# Patient Record
Sex: FEMALE | Race: WHITE | Employment: FULL TIME | URBAN - METROPOLITAN AREA
[De-identification: names, ages, dates, MRNs, and addresses within clinical notes are randomized per-mention and may not be internally consistent; named-entity substitution may affect disease eponyms.]

---

## 2022-01-01 ENCOUNTER — TELEPHONE (OUTPATIENT)
Dept: ONCOLOGY | Age: 42
End: 2022-01-01

## 2022-01-01 ENCOUNTER — HOSPITAL ENCOUNTER (OUTPATIENT)
Dept: INFUSION THERAPY | Age: 42
Discharge: HOME | End: 2022-07-12
Payer: COMMERCIAL

## 2022-01-01 ENCOUNTER — NURSE NAVIGATOR (OUTPATIENT)
Dept: CASE MANAGEMENT | Age: 42
End: 2022-01-01

## 2022-01-01 ENCOUNTER — HOSPITAL ENCOUNTER (INPATIENT)
Age: 42
LOS: 1 days | DRG: 871 | End: 2022-07-15
Attending: STUDENT IN AN ORGANIZED HEALTH CARE EDUCATION/TRAINING PROGRAM | Admitting: HOSPITALIST
Payer: COMMERCIAL

## 2022-01-01 ENCOUNTER — APPOINTMENT (OUTPATIENT)
Dept: GENERAL RADIOLOGY | Age: 42
End: 2022-01-01
Attending: SURGERY
Payer: COMMERCIAL

## 2022-01-01 ENCOUNTER — DOCUMENTATION ONLY (OUTPATIENT)
Dept: ONCOLOGY | Age: 42
End: 2022-01-01

## 2022-01-01 ENCOUNTER — DOCUMENTATION ONLY (OUTPATIENT)
Dept: SURGERY | Age: 42
End: 2022-01-01

## 2022-01-01 ENCOUNTER — HOSPITAL ENCOUNTER (OUTPATIENT)
Dept: INFUSION THERAPY | Age: 42
Discharge: HOME | End: 2022-07-08
Payer: COMMERCIAL

## 2022-01-01 ENCOUNTER — HOSPITAL ENCOUNTER (OUTPATIENT)
Dept: NUCLEAR MEDICINE | Age: 42
Discharge: HOME | End: 2022-06-20
Attending: SURGERY
Payer: COMMERCIAL

## 2022-01-01 ENCOUNTER — HOSPITAL ENCOUNTER (OUTPATIENT)
Age: 42
Setting detail: OUTPATIENT SURGERY
Discharge: HOME | End: 2022-06-23
Attending: SURGERY | Admitting: SURGERY
Payer: COMMERCIAL

## 2022-01-01 ENCOUNTER — HOSPITAL ENCOUNTER (OUTPATIENT)
Dept: CT IMAGING | Age: 42
Discharge: HOME | End: 2022-06-20
Attending: SURGERY
Payer: COMMERCIAL

## 2022-01-01 ENCOUNTER — APPOINTMENT (OUTPATIENT)
Dept: CT IMAGING | Age: 42
DRG: 871 | End: 2022-01-01
Payer: COMMERCIAL

## 2022-01-01 ENCOUNTER — TELEPHONE (OUTPATIENT)
Dept: PALLATIVE CARE | Age: 42
End: 2022-01-01

## 2022-01-01 ENCOUNTER — ANESTHESIA EVENT (OUTPATIENT)
Dept: INTERNAL MEDICINE UNIT | Age: 42
DRG: 871 | End: 2022-01-01
Payer: COMMERCIAL

## 2022-01-01 ENCOUNTER — ANESTHESIA EVENT (OUTPATIENT)
Dept: SURGERY | Age: 42
End: 2022-01-01
Payer: COMMERCIAL

## 2022-01-01 ENCOUNTER — ANESTHESIA (OUTPATIENT)
Dept: INTERNAL MEDICINE UNIT | Age: 42
DRG: 871 | End: 2022-01-01
Payer: COMMERCIAL

## 2022-01-01 ENCOUNTER — ANESTHESIA (OUTPATIENT)
Dept: SURGERY | Age: 42
End: 2022-01-01
Payer: COMMERCIAL

## 2022-01-01 ENCOUNTER — OFFICE VISIT (OUTPATIENT)
Dept: ONCOLOGY | Age: 42
End: 2022-01-01

## 2022-01-01 ENCOUNTER — OFFICE VISIT (OUTPATIENT)
Dept: ONCOLOGY | Age: 42
End: 2022-01-01
Payer: COMMERCIAL

## 2022-01-01 ENCOUNTER — OFFICE VISIT (OUTPATIENT)
Dept: SURGERY | Age: 42
End: 2022-01-01
Payer: COMMERCIAL

## 2022-01-01 ENCOUNTER — APPOINTMENT (OUTPATIENT)
Dept: PHYSICAL THERAPY | Age: 42
End: 2022-01-01

## 2022-01-01 ENCOUNTER — APPOINTMENT (OUTPATIENT)
Dept: GENERAL RADIOLOGY | Age: 42
DRG: 871 | End: 2022-01-01
Attending: NURSE PRACTITIONER
Payer: COMMERCIAL

## 2022-01-01 VITALS
WEIGHT: 293 LBS | SYSTOLIC BLOOD PRESSURE: 158 MMHG | DIASTOLIC BLOOD PRESSURE: 88 MMHG | HEIGHT: 62 IN | TEMPERATURE: 97.6 F | HEART RATE: 102 BPM | BODY MASS INDEX: 53.92 KG/M2 | OXYGEN SATURATION: 97 % | RESPIRATION RATE: 18 BRPM

## 2022-01-01 VITALS
BODY MASS INDEX: 53.92 KG/M2 | HEIGHT: 62 IN | SYSTOLIC BLOOD PRESSURE: 180 MMHG | DIASTOLIC BLOOD PRESSURE: 92 MMHG | OXYGEN SATURATION: 96 % | RESPIRATION RATE: 19 BRPM | TEMPERATURE: 98.7 F | WEIGHT: 293 LBS | HEART RATE: 96 BPM

## 2022-01-01 VITALS
HEIGHT: 62 IN | BODY MASS INDEX: 53.92 KG/M2 | RESPIRATION RATE: 18 BRPM | WEIGHT: 293 LBS | TEMPERATURE: 97.6 F | DIASTOLIC BLOOD PRESSURE: 81 MMHG | SYSTOLIC BLOOD PRESSURE: 159 MMHG | HEART RATE: 103 BPM | OXYGEN SATURATION: 96 %

## 2022-01-01 VITALS
BODY MASS INDEX: 55.32 KG/M2 | DIASTOLIC BLOOD PRESSURE: 89 MMHG | SYSTOLIC BLOOD PRESSURE: 134 MMHG | TEMPERATURE: 98.3 F | OXYGEN SATURATION: 97 % | WEIGHT: 293 LBS | HEART RATE: 140 BPM | HEIGHT: 61 IN

## 2022-01-01 VITALS
DIASTOLIC BLOOD PRESSURE: 86 MMHG | RESPIRATION RATE: 18 BRPM | OXYGEN SATURATION: 98 % | SYSTOLIC BLOOD PRESSURE: 165 MMHG | TEMPERATURE: 98.9 F | HEART RATE: 93 BPM

## 2022-01-01 VITALS
DIASTOLIC BLOOD PRESSURE: 88 MMHG | SYSTOLIC BLOOD PRESSURE: 163 MMHG | BODY MASS INDEX: 53.92 KG/M2 | TEMPERATURE: 98 F | OXYGEN SATURATION: 95 % | HEIGHT: 62 IN | HEART RATE: 94 BPM | WEIGHT: 293 LBS

## 2022-01-01 VITALS
WEIGHT: 293 LBS | BODY MASS INDEX: 53.92 KG/M2 | HEIGHT: 62 IN | HEART RATE: 107 BPM | SYSTOLIC BLOOD PRESSURE: 190 MMHG | DIASTOLIC BLOOD PRESSURE: 124 MMHG

## 2022-01-01 DIAGNOSIS — Z51.11 ENCOUNTER FOR ANTINEOPLASTIC CHEMOTHERAPY: ICD-10-CM

## 2022-01-01 DIAGNOSIS — A41.9 SEPSIS WITHOUT ACUTE ORGAN DYSFUNCTION, DUE TO UNSPECIFIED ORGANISM (HCC): ICD-10-CM

## 2022-01-01 DIAGNOSIS — K76.89 LIVER DYSFUNCTION: ICD-10-CM

## 2022-01-01 DIAGNOSIS — C79.51 CARCINOMA OF RIGHT BREAST METASTATIC TO BONE (HCC): ICD-10-CM

## 2022-01-01 DIAGNOSIS — C50.911 BREAST CANCER, STAGE 3, RIGHT (HCC): ICD-10-CM

## 2022-01-01 DIAGNOSIS — C50.911 CARCINOMA OF RIGHT BREAST METASTATIC TO BONE (HCC): ICD-10-CM

## 2022-01-01 DIAGNOSIS — K76.7 HEPATORENAL SYNDROME (HCC): ICD-10-CM

## 2022-01-01 DIAGNOSIS — Z51.81 ENCOUNTER FOR MONITORING CARDIOTOXIC DRUG THERAPY: Primary | ICD-10-CM

## 2022-01-01 DIAGNOSIS — C50.911 CARCINOMA OF RIGHT BREAST METASTATIC TO LIVER (HCC): Primary | ICD-10-CM

## 2022-01-01 DIAGNOSIS — C50.911 BREAST CANCER, STAGE 3, RIGHT (HCC): Primary | ICD-10-CM

## 2022-01-01 DIAGNOSIS — I95.9 HYPOTENSION, UNSPECIFIED HYPOTENSION TYPE: Primary | ICD-10-CM

## 2022-01-01 DIAGNOSIS — C50.919 METASTATIC BREAST CANCER (HCC): Primary | ICD-10-CM

## 2022-01-01 DIAGNOSIS — C50.911 CARCINOMA OF RIGHT BREAST METASTATIC TO LIVER (HCC): ICD-10-CM

## 2022-01-01 DIAGNOSIS — R19.7 DIARRHEA, UNSPECIFIED TYPE: ICD-10-CM

## 2022-01-01 DIAGNOSIS — E87.20 LACTIC ACIDOSIS: ICD-10-CM

## 2022-01-01 DIAGNOSIS — G89.3 CANCER RELATED PAIN: ICD-10-CM

## 2022-01-01 DIAGNOSIS — C78.7 CARCINOMA OF RIGHT BREAST METASTATIC TO LIVER (HCC): Primary | ICD-10-CM

## 2022-01-01 DIAGNOSIS — C50.911 BREAST CANCER, STAGE 4, RIGHT (HCC): ICD-10-CM

## 2022-01-01 DIAGNOSIS — C50.911 MALIGNANT NEOPLASM OF RIGHT FEMALE BREAST, UNSPECIFIED ESTROGEN RECEPTOR STATUS, UNSPECIFIED SITE OF BREAST (HCC): Primary | ICD-10-CM

## 2022-01-01 DIAGNOSIS — C78.7 CARCINOMA OF RIGHT BREAST METASTATIC TO LIVER (HCC): ICD-10-CM

## 2022-01-01 DIAGNOSIS — Z79.899 ENCOUNTER FOR MONITORING CARDIOTOXIC DRUG THERAPY: Primary | ICD-10-CM

## 2022-01-01 DIAGNOSIS — C78.7 METASTATIC CANCER TO LIVER (HCC): ICD-10-CM

## 2022-01-01 DIAGNOSIS — K12.30 ORAL MUCOSITIS: Primary | ICD-10-CM

## 2022-01-01 DIAGNOSIS — E87.20 METABOLIC ACIDOSIS: ICD-10-CM

## 2022-01-01 DIAGNOSIS — K72.00 ACUTE LIVER FAILURE WITHOUT HEPATIC COMA: ICD-10-CM

## 2022-01-01 LAB
ACC. NO. FROM MICRO ORDER, ACCP: ABNORMAL
ACINETOBACTER CALCOACETICUS-BAUMANII COMPLEX, ACBCX: NOT DETECTED
ALBUMIN SERPL-MCNC: 2 G/DL (ref 3.5–5)
ALBUMIN SERPL-MCNC: 2 G/DL (ref 3.5–5)
ALBUMIN SERPL-MCNC: 2.6 G/DL (ref 3.5–5)
ALBUMIN/GLOB SERPL: 0.4 {RATIO} (ref 1.1–2.2)
ALBUMIN/GLOB SERPL: 0.5 {RATIO} (ref 1.1–2.2)
ALBUMIN/GLOB SERPL: 0.6 {RATIO} (ref 1.1–2.2)
ALP SERPL-CCNC: 455 U/L (ref 45–117)
ALP SERPL-CCNC: 584 U/L (ref 45–117)
ALP SERPL-CCNC: 850 U/L (ref 45–117)
ALT SERPL-CCNC: 40 U/L (ref 12–78)
ALT SERPL-CCNC: 65 U/L (ref 12–78)
ALT SERPL-CCNC: 68 U/L (ref 12–78)
AMMONIA PLAS-SCNC: <10 UMOL/L
ANION GAP SERPL CALC-SCNC: 14 MMOL/L (ref 5–15)
ANION GAP SERPL CALC-SCNC: 15 MMOL/L (ref 5–15)
ANION GAP SERPL CALC-SCNC: 9 MMOL/L (ref 5–15)
APAP SERPL-MCNC: <2 UG/ML (ref 10–30)
ARTERIAL PATENCY WRIST A: YES
ARTERIAL PATENCY WRIST A: YES
AST SERPL-CCNC: 169 U/L (ref 15–37)
AST SERPL-CCNC: 178 U/L (ref 15–37)
AST SERPL-CCNC: 227 U/L (ref 15–37)
ATRIAL RATE: 128 BPM
BACTEROIDES FRAGILIS, BFRA: NOT DETECTED
BASE DEFICIT BLDA-SCNC: 11.7 MMOL/L
BASE DEFICIT BLDA-SCNC: 15.9 MMOL/L
BASOPHILS # BLD: 0 K/UL (ref 0–0.1)
BASOPHILS # BLD: 0 K/UL (ref 0–0.1)
BASOPHILS # BLD: 0.1 K/UL (ref 0–0.1)
BASOPHILS NFR BLD: 0 % (ref 0–1)
BASOPHILS NFR BLD: 0 % (ref 0–1)
BASOPHILS NFR BLD: 1 % (ref 0–1)
BDY SITE: ABNORMAL
BDY SITE: ABNORMAL
BILIRUB DIRECT SERPL-MCNC: 9.4 MG/DL (ref 0–0.2)
BILIRUB SERPL-MCNC: 10.7 MG/DL (ref 0.2–1)
BILIRUB SERPL-MCNC: 11.1 MG/DL (ref 0.2–1)
BILIRUB SERPL-MCNC: 7.6 MG/DL (ref 0.2–1)
BIOFIRE COMMENT, BCIDPF: ABNORMAL
BNP SERPL-MCNC: 7267 PG/ML
BUN SERPL-MCNC: 21 MG/DL (ref 6–20)
BUN SERPL-MCNC: 39 MG/DL (ref 6–20)
BUN SERPL-MCNC: 44 MG/DL (ref 6–20)
BUN/CREAT SERPL: 16 (ref 12–20)
BUN/CREAT SERPL: 17 (ref 12–20)
BUN/CREAT SERPL: 28 (ref 12–20)
C GLABRATA DNA VAG QL NAA+PROBE: NOT DETECTED
CALCIUM SERPL-MCNC: 10.6 MG/DL (ref 8.5–10.1)
CALCIUM SERPL-MCNC: 7.5 MG/DL (ref 8.5–10.1)
CALCIUM SERPL-MCNC: 8.1 MG/DL (ref 8.5–10.1)
CALCULATED P AXIS, ECG09: 80 DEGREES
CALCULATED R AXIS, ECG10: 2 DEGREES
CALCULATED T AXIS, ECG11: -17 DEGREES
CANDIDA ALBICANS: NOT DETECTED
CANDIDA AURIS, CAAU: NOT DETECTED
CANDIDA KRUSEI, CKRP: NOT DETECTED
CANDIDA PARAPSILOSIS, CPAUP: NOT DETECTED
CANDIDA TROPICALIS, CTROP: NOT DETECTED
CHLORIDE SERPL-SCNC: 100 MMOL/L (ref 97–108)
CHLORIDE SERPL-SCNC: 101 MMOL/L (ref 97–108)
CHLORIDE SERPL-SCNC: 105 MMOL/L (ref 97–108)
CO2 SERPL-SCNC: 14 MMOL/L (ref 21–32)
CO2 SERPL-SCNC: 16 MMOL/L (ref 21–32)
CO2 SERPL-SCNC: 23 MMOL/L (ref 21–32)
COVID-19 RAPID TEST, COVR: NOT DETECTED
CREAT SERPL-MCNC: 0.74 MG/DL (ref 0.55–1.02)
CREAT SERPL-MCNC: 2.23 MG/DL (ref 0.55–1.02)
CREAT SERPL-MCNC: 2.77 MG/DL (ref 0.55–1.02)
CRYPTO NEOFORMANS/GATTII, CRYNEG: NOT DETECTED
CTX-M (ESBL RESISTANT GENE), CTX: NOT DETECTED
DIAGNOSIS, 93000: NORMAL
DIFFERENTIAL METHOD BLD: ABNORMAL
ENTEROBACTER CLOACAE COMPLEX, ECCP: NOT DETECTED
ENTEROBACTERALES SP. , ENBLS: DETECTED
ENTEROCOCCUS FAECALIS, ENFA: NOT DETECTED
ENTEROCOCCUS FAECIUM, ENFAM: NOT DETECTED
EOSINOPHIL # BLD: 0 K/UL (ref 0–0.4)
EOSINOPHIL # BLD: 0 K/UL (ref 0–0.4)
EOSINOPHIL # BLD: 0.1 K/UL (ref 0–0.4)
EOSINOPHIL NFR BLD: 0 % (ref 0–7)
EOSINOPHIL NFR BLD: 0 % (ref 0–7)
EOSINOPHIL NFR BLD: 1 % (ref 0–7)
ERYTHROCYTE [DISTWIDTH] IN BLOOD BY AUTOMATED COUNT: 20.8 % (ref 11.5–14.5)
ERYTHROCYTE [DISTWIDTH] IN BLOOD BY AUTOMATED COUNT: 20.8 % (ref 11.5–14.5)
ERYTHROCYTE [DISTWIDTH] IN BLOOD BY AUTOMATED COUNT: 21.2 % (ref 11.5–14.5)
ESCHERICHIA COLI: DETECTED
GAS FLOW.O2 O2 DELIVERY SYS: 2 L/MIN
GLOBULIN SER CALC-MCNC: 4.3 G/DL (ref 2–4)
GLOBULIN SER CALC-MCNC: 4.4 G/DL (ref 2–4)
GLOBULIN SER CALC-MCNC: 5 G/DL (ref 2–4)
GLUCOSE BLD STRIP.AUTO-MCNC: 63 MG/DL (ref 65–117)
GLUCOSE BLD STRIP.AUTO-MCNC: 79 MG/DL (ref 65–117)
GLUCOSE SERPL-MCNC: 68 MG/DL (ref 65–100)
GLUCOSE SERPL-MCNC: 95 MG/DL (ref 65–100)
GLUCOSE SERPL-MCNC: 98 MG/DL (ref 65–100)
HAEMOPHILUS INFLUENZAE, HMI: NOT DETECTED
HAV IGM SER QL: NONREACTIVE
HAV IGM SER QL: NONREACTIVE
HBV CORE IGM SER QL: NONREACTIVE
HBV CORE IGM SER QL: NONREACTIVE
HBV SURFACE AG SER QL: 0.16 INDEX
HBV SURFACE AG SER QL: <0.1 INDEX
HBV SURFACE AG SER QL: NEGATIVE
HBV SURFACE AG SER QL: NEGATIVE
HCO3 BLDA-SCNC: 10 MMOL/L (ref 22–26)
HCO3 BLDA-SCNC: 12 MMOL/L (ref 22–26)
HCT VFR BLD AUTO: 35.3 % (ref 35–47)
HCT VFR BLD AUTO: 35.9 % (ref 35–47)
HCT VFR BLD AUTO: 41.2 % (ref 35–47)
HCV AB SERPL QL IA: NONREACTIVE
HCV AB SERPL QL IA: NONREACTIVE
HGB BLD-MCNC: 11.1 G/DL (ref 11.5–16)
HGB BLD-MCNC: 11.6 G/DL (ref 11.5–16)
HGB BLD-MCNC: 12.9 G/DL (ref 11.5–16)
IMM GRANULOCYTES # BLD AUTO: 0 K/UL (ref 0–0.04)
IMM GRANULOCYTES # BLD AUTO: 0 K/UL (ref 0–0.04)
IMM GRANULOCYTES # BLD AUTO: 0.1 K/UL (ref 0–0.04)
IMM GRANULOCYTES NFR BLD AUTO: 0 % (ref 0–0.5)
IMM GRANULOCYTES NFR BLD AUTO: 0 % (ref 0–0.5)
IMM GRANULOCYTES NFR BLD AUTO: 1 % (ref 0–0.5)
IMP (CARBAPENEMASE RESISTANT GENE), IMPC: NOT DETECTED
INR PPP: 2.1 (ref 0.9–1.1)
KLEBSIELLA AEROGENES, KLAE: NOT DETECTED
KLEBSIELLA OXYTOCA: NOT DETECTED
KLEBSIELLA PNEUMONIAE GROUP, KPPG: NOT DETECTED
KPC (CARBAPENEM RESISTANCE GENE): NOT DETECTED
LACTATE SERPL-SCNC: 4.9 MMOL/L (ref 0.4–2)
LACTATE SERPL-SCNC: 5.1 MMOL/L (ref 0.4–2)
LACTATE SERPL-SCNC: 5.9 MMOL/L (ref 0.4–2)
LIPASE SERPL-CCNC: 27 U/L (ref 73–393)
LISTERIA MONOCYTOGENES, LMONP: NOT DETECTED
LYMPHOCYTES # BLD: 0.3 K/UL (ref 0.8–3.5)
LYMPHOCYTES # BLD: 0.4 K/UL (ref 0.8–3.5)
LYMPHOCYTES # BLD: 1.8 K/UL (ref 0.8–3.5)
LYMPHOCYTES NFR BLD: 14 % (ref 12–49)
LYMPHOCYTES NFR BLD: 72 % (ref 12–49)
LYMPHOCYTES NFR BLD: 76 % (ref 12–49)
MAGNESIUM SERPL-MCNC: 2.7 MG/DL (ref 1.6–2.4)
MCH RBC QN AUTO: 27.4 PG (ref 26–34)
MCH RBC QN AUTO: 27.9 PG (ref 26–34)
MCH RBC QN AUTO: 28 PG (ref 26–34)
MCHC RBC AUTO-ENTMCNC: 31.3 G/DL (ref 30–36.5)
MCHC RBC AUTO-ENTMCNC: 31.4 G/DL (ref 30–36.5)
MCHC RBC AUTO-ENTMCNC: 32.3 G/DL (ref 30–36.5)
MCR-1 (COLISTIN RESISTANT GENE), MCR: NOT DETECTED
MCV RBC AUTO: 86.5 FL (ref 80–99)
MCV RBC AUTO: 87.5 FL (ref 80–99)
MCV RBC AUTO: 88.7 FL (ref 80–99)
MONOCYTES # BLD: 0 K/UL (ref 0–1)
MONOCYTES # BLD: 0 K/UL (ref 0–1)
MONOCYTES # BLD: 1 K/UL (ref 0–1)
MONOCYTES NFR BLD: 12 % (ref 5–13)
MONOCYTES NFR BLD: 16 % (ref 5–13)
MONOCYTES NFR BLD: 8 % (ref 5–13)
NDM (CARBAPENEMASE RESISTANT GENE), NDM: NOT DETECTED
NEISSERIA MENINGITIDIS, NMNI: NOT DETECTED
NEUTS SEG # BLD: 0 K/UL (ref 1.8–8)
NEUTS SEG # BLD: 0 K/UL (ref 1.8–8)
NEUTS SEG # BLD: 9.5 K/UL (ref 1.8–8)
NEUTS SEG NFR BLD: 12 % (ref 32–75)
NEUTS SEG NFR BLD: 12 % (ref 32–75)
NEUTS SEG NFR BLD: 75 % (ref 32–75)
NRBC # BLD: 0 K/UL (ref 0–0.01)
NRBC # BLD: 0.02 K/UL (ref 0–0.01)
NRBC # BLD: 0.02 K/UL (ref 0–0.01)
NRBC BLD-RTO: 0 PER 100 WBC
NRBC BLD-RTO: 5.1 PER 100 WBC
NRBC BLD-RTO: 6.9 PER 100 WBC
OXA-48-LIKE (CARBAPENEMASE RESISTANT GENE), OXA48: NOT DETECTED
P-R INTERVAL, ECG05: 160 MS
PCO2 BLDA: 24 MMHG (ref 35–45)
PCO2 BLDA: 25 MMHG (ref 35–45)
PH BLDA: 7.22 [PH] (ref 7.35–7.45)
PH BLDA: 7.32 [PH] (ref 7.35–7.45)
PLATELET # BLD AUTO: 251 K/UL (ref 150–400)
PLATELET # BLD AUTO: 273 K/UL (ref 150–400)
PLATELET # BLD AUTO: 340 K/UL (ref 150–400)
PLATELET COMMENTS,PCOM: ABNORMAL
PMV BLD AUTO: 11.5 FL (ref 8.9–12.9)
PO2 BLDA: 100 MMHG (ref 80–100)
PO2 BLDA: 94 MMHG (ref 80–100)
POTASSIUM SERPL-SCNC: 3.5 MMOL/L (ref 3.5–5.1)
POTASSIUM SERPL-SCNC: 4.5 MMOL/L (ref 3.5–5.1)
POTASSIUM SERPL-SCNC: 4.6 MMOL/L (ref 3.5–5.1)
PROT SERPL-MCNC: 6.4 G/DL (ref 6.4–8.2)
PROT SERPL-MCNC: 6.9 G/DL (ref 6.4–8.2)
PROT SERPL-MCNC: 7 G/DL (ref 6.4–8.2)
PROTEUS, PRP: NOT DETECTED
PROTHROMBIN TIME: 20.6 SEC (ref 9–11.1)
PSEUDOMONAS AERUGINOSA: NOT DETECTED
Q-T INTERVAL, ECG07: 308 MS
QRS DURATION, ECG06: 74 MS
QTC CALCULATION (BEZET), ECG08: 449 MS
RBC # BLD AUTO: 3.98 M/UL (ref 3.8–5.2)
RBC # BLD AUTO: 4.15 M/UL (ref 3.8–5.2)
RBC # BLD AUTO: 4.71 M/UL (ref 3.8–5.2)
RBC MORPH BLD: ABNORMAL
RESISTANT GENE SPACE, REGENE: ABNORMAL
SALICYLATES SERPL-MCNC: <1.7 MG/DL (ref 2.8–20)
SALMONELLA, SALMO: NOT DETECTED
SAO2 % BLD: 96 % (ref 92–97)
SAO2 % BLD: 97 % (ref 92–97)
SAO2% DEVICE SAO2% SENSOR NAME: ABNORMAL
SAO2% DEVICE SAO2% SENSOR NAME: ABNORMAL
SERRATIA MARCESCENS: NOT DETECTED
SERVICE CMNT-IMP: ABNORMAL
SERVICE CMNT-IMP: ABNORMAL
SERVICE CMNT-IMP: NORMAL
SODIUM SERPL-SCNC: 130 MMOL/L (ref 136–145)
SODIUM SERPL-SCNC: 130 MMOL/L (ref 136–145)
SODIUM SERPL-SCNC: 137 MMOL/L (ref 136–145)
SOURCE, COVRS: NORMAL
SP1: NORMAL
SP1: NORMAL
SP2: NORMAL
SP2: NORMAL
SP3: NORMAL
SP3: NORMAL
SPECIMEN SITE: ABNORMAL
SPECIMEN SITE: ABNORMAL
STAPH EPIDERMIDIS, STEP: NOT DETECTED
STAPH LUGDUNENSIS, STALUG: NOT DETECTED
STAPHYLOCOCCUS AUREUS: NOT DETECTED
STAPHYLOCOCCUS, STAPP: NOT DETECTED
STENO MALTOPHILIA, STMA: NOT DETECTED
STREPTOCOCCUS , STPSP: DETECTED
STREPTOCOCCUS AGALACTIAE (GROUP B): NOT DETECTED
STREPTOCOCCUS PNEUMONIAE , SPNP: NOT DETECTED
STREPTOCOCCUS PYOGENES (GROUP A), SPYOP: NOT DETECTED
TOTAL CELLS COUNTED SPEC: 25
TOTAL CELLS COUNTED SPEC: 25
TROPONIN-HIGH SENSITIVITY: <4 NG/L (ref 0–51)
VENTRICULAR RATE, ECG03: 128 BPM
VIM (CARBAPENEMASE RESISTANT GENE), VIM: NOT DETECTED
WBC # BLD AUTO: 0.3 K/UL (ref 3.6–11)
WBC # BLD AUTO: 0.4 K/UL (ref 3.6–11)
WBC # BLD AUTO: 12.6 K/UL (ref 3.6–11)
WBC MORPH BLD: ABNORMAL

## 2022-01-01 PROCEDURE — 82803 BLOOD GASES ANY COMBINATION: CPT

## 2022-01-01 PROCEDURE — 74011250636 HC RX REV CODE- 250/636: Performed by: HOSPITALIST

## 2022-01-01 PROCEDURE — 96375 TX/PRO/DX INJ NEW DRUG ADDON: CPT

## 2022-01-01 PROCEDURE — 74011000250 HC RX REV CODE- 250: Performed by: HOSPITALIST

## 2022-01-01 PROCEDURE — 74011000258 HC RX REV CODE- 258

## 2022-01-01 PROCEDURE — 71045 X-RAY EXAM CHEST 1 VIEW: CPT

## 2022-01-01 PROCEDURE — 76937 US GUIDE VASCULAR ACCESS: CPT | Performed by: SURGERY

## 2022-01-01 PROCEDURE — 74011250636 HC RX REV CODE- 250/636: Performed by: INTERNAL MEDICINE

## 2022-01-01 PROCEDURE — 87150 DNA/RNA AMPLIFIED PROBE: CPT

## 2022-01-01 PROCEDURE — 96367 TX/PROPH/DG ADDL SEQ IV INF: CPT

## 2022-01-01 PROCEDURE — 36415 COLL VENOUS BLD VENIPUNCTURE: CPT

## 2022-01-01 PROCEDURE — 96417 CHEMO IV INFUS EACH ADDL SEQ: CPT

## 2022-01-01 PROCEDURE — 80076 HEPATIC FUNCTION PANEL: CPT

## 2022-01-01 PROCEDURE — 71260 CT THORAX DX C+: CPT

## 2022-01-01 PROCEDURE — 36600 WITHDRAWAL OF ARTERIAL BLOOD: CPT

## 2022-01-01 PROCEDURE — 0BH17EZ INSERTION OF ENDOTRACHEAL AIRWAY INTO TRACHEA, VIA NATURAL OR ARTIFICIAL OPENING: ICD-10-PCS | Performed by: ANESTHESIOLOGY

## 2022-01-01 PROCEDURE — 96366 THER/PROPH/DIAG IV INF ADDON: CPT

## 2022-01-01 PROCEDURE — 85610 PROTHROMBIN TIME: CPT

## 2022-01-01 PROCEDURE — 74011000250 HC RX REV CODE- 250: Performed by: SURGERY

## 2022-01-01 PROCEDURE — 85025 COMPLETE CBC W/AUTO DIFF WBC: CPT

## 2022-01-01 PROCEDURE — 94002 VENT MGMT INPAT INIT DAY: CPT

## 2022-01-01 PROCEDURE — 77030002996 HC SUT SLK J&J -A: Performed by: SURGERY

## 2022-01-01 PROCEDURE — 71250 CT THORAX DX C-: CPT

## 2022-01-01 PROCEDURE — 74011000250 HC RX REV CODE- 250: Performed by: INTERNAL MEDICINE

## 2022-01-01 PROCEDURE — 82962 GLUCOSE BLOOD TEST: CPT

## 2022-01-01 PROCEDURE — P9047 ALBUMIN (HUMAN), 25%, 50ML: HCPCS | Performed by: HOSPITALIST

## 2022-01-01 PROCEDURE — 78306 BONE IMAGING WHOLE BODY: CPT

## 2022-01-01 PROCEDURE — 76210000034 HC AMBSU PH I REC 0.5 TO 1 HR: Performed by: SURGERY

## 2022-01-01 PROCEDURE — 80143 DRUG ASSAY ACETAMINOPHEN: CPT

## 2022-01-01 PROCEDURE — 96374 THER/PROPH/DIAG INJ IV PUSH: CPT

## 2022-01-01 PROCEDURE — 76030000001 HC AMB SURG OR TIME 1 TO 1.5: Performed by: SURGERY

## 2022-01-01 PROCEDURE — 80053 COMPREHEN METABOLIC PANEL: CPT

## 2022-01-01 PROCEDURE — 77030010509 HC AIRWY LMA MSK TELE -A: Performed by: ANESTHESIOLOGY

## 2022-01-01 PROCEDURE — 99205 OFFICE O/P NEW HI 60 MIN: CPT | Performed by: INTERNAL MEDICINE

## 2022-01-01 PROCEDURE — 80074 ACUTE HEPATITIS PANEL: CPT

## 2022-01-01 PROCEDURE — 74011000250 HC RX REV CODE- 250: Performed by: REGISTERED NURSE

## 2022-01-01 PROCEDURE — 80048 BASIC METABOLIC PNL TOTAL CA: CPT

## 2022-01-01 PROCEDURE — 76060000062 HC AMB SURG ANES 1 TO 1.5 HR: Performed by: SURGERY

## 2022-01-01 PROCEDURE — 74177 CT ABD & PELVIS W/CONTRAST: CPT

## 2022-01-01 PROCEDURE — 36561 INSERT TUNNELED CV CATH: CPT | Performed by: SURGERY

## 2022-01-01 PROCEDURE — 74011250637 HC RX REV CODE- 250/637: Performed by: HOSPITALIST

## 2022-01-01 PROCEDURE — 87186 SC STD MICRODIL/AGAR DIL: CPT

## 2022-01-01 PROCEDURE — 77030031139 HC SUT VCRL2 J&J -A: Performed by: SURGERY

## 2022-01-01 PROCEDURE — 87077 CULTURE AEROBIC IDENTIFY: CPT

## 2022-01-01 PROCEDURE — 74011250636 HC RX REV CODE- 250/636

## 2022-01-01 PROCEDURE — 77030012965 HC NDL HUBR BBMI -A

## 2022-01-01 PROCEDURE — 74011250636 HC RX REV CODE- 250/636: Performed by: SURGERY

## 2022-01-01 PROCEDURE — 96365 THER/PROPH/DIAG IV INF INIT: CPT

## 2022-01-01 PROCEDURE — 74018 RADEX ABDOMEN 1 VIEW: CPT

## 2022-01-01 PROCEDURE — 5A1935Z RESPIRATORY VENTILATION, LESS THAN 24 CONSECUTIVE HOURS: ICD-10-PCS | Performed by: ANESTHESIOLOGY

## 2022-01-01 PROCEDURE — 74011000250 HC RX REV CODE- 250: Performed by: NURSE PRACTITIONER

## 2022-01-01 PROCEDURE — 83605 ASSAY OF LACTIC ACID: CPT

## 2022-01-01 PROCEDURE — 74011250636 HC RX REV CODE- 250/636: Performed by: ANESTHESIOLOGY

## 2022-01-01 PROCEDURE — 2709999900 HC NON-CHARGEABLE SUPPLY: Performed by: SURGERY

## 2022-01-01 PROCEDURE — 87635 SARS-COV-2 COVID-19 AMP PRB: CPT

## 2022-01-01 PROCEDURE — C1788 PORT, INDWELLING, IMP: HCPCS | Performed by: SURGERY

## 2022-01-01 PROCEDURE — 74011000272 HC RX REV CODE- 272: Performed by: SURGERY

## 2022-01-01 PROCEDURE — 96361 HYDRATE IV INFUSION ADD-ON: CPT

## 2022-01-01 PROCEDURE — 77001 FLUOROGUIDE FOR VEIN DEVICE: CPT | Performed by: SURGERY

## 2022-01-01 PROCEDURE — 83690 ASSAY OF LIPASE: CPT

## 2022-01-01 PROCEDURE — 93005 ELECTROCARDIOGRAM TRACING: CPT

## 2022-01-01 PROCEDURE — 96413 CHEMO IV INFUSION 1 HR: CPT

## 2022-01-01 PROCEDURE — 87040 BLOOD CULTURE FOR BACTERIA: CPT

## 2022-01-01 PROCEDURE — 80179 DRUG ASSAY SALICYLATE: CPT

## 2022-01-01 PROCEDURE — 74011000636 HC RX REV CODE- 636: Performed by: STUDENT IN AN ORGANIZED HEALTH CARE EDUCATION/TRAINING PROGRAM

## 2022-01-01 PROCEDURE — 74011250636 HC RX REV CODE- 250/636: Performed by: REGISTERED NURSE

## 2022-01-01 PROCEDURE — 74176 CT ABD & PELVIS W/O CONTRAST: CPT

## 2022-01-01 PROCEDURE — 77030011267 HC ELECTRD BLD COVD -A: Performed by: SURGERY

## 2022-01-01 PROCEDURE — 76210000046 HC AMBSU PH II REC FIRST 0.5 HR: Performed by: SURGERY

## 2022-01-01 PROCEDURE — 76642 ULTRASOUND BREAST LIMITED: CPT | Performed by: SURGERY

## 2022-01-01 PROCEDURE — 99215 OFFICE O/P EST HI 40 MIN: CPT | Performed by: INTERNAL MEDICINE

## 2022-01-01 PROCEDURE — 84484 ASSAY OF TROPONIN QUANT: CPT

## 2022-01-01 PROCEDURE — 74011000258 HC RX REV CODE- 258: Performed by: INTERNAL MEDICINE

## 2022-01-01 PROCEDURE — 77030002986 HC SUT PROL J&J -A: Performed by: SURGERY

## 2022-01-01 PROCEDURE — P9045 ALBUMIN (HUMAN), 5%, 250 ML: HCPCS

## 2022-01-01 PROCEDURE — 65270000046 HC RM TELEMETRY

## 2022-01-01 PROCEDURE — 74011000636 HC RX REV CODE- 636: Performed by: SURGERY

## 2022-01-01 PROCEDURE — 82140 ASSAY OF AMMONIA: CPT

## 2022-01-01 PROCEDURE — 77030010507 HC ADH SKN DERMBND J&J -B: Performed by: SURGERY

## 2022-01-01 PROCEDURE — 96360 HYDRATION IV INFUSION INIT: CPT

## 2022-01-01 PROCEDURE — 74011250636 HC RX REV CODE- 250/636: Performed by: NURSE PRACTITIONER

## 2022-01-01 PROCEDURE — 76000 FLUOROSCOPY <1 HR PHYS/QHP: CPT

## 2022-01-01 PROCEDURE — 77030002933 HC SUT MCRYL J&J -A: Performed by: SURGERY

## 2022-01-01 PROCEDURE — 99285 EMERGENCY DEPT VISIT HI MDM: CPT

## 2022-01-01 PROCEDURE — 99205 OFFICE O/P NEW HI 60 MIN: CPT | Performed by: SURGERY

## 2022-01-01 PROCEDURE — 83880 ASSAY OF NATRIURETIC PEPTIDE: CPT

## 2022-01-01 PROCEDURE — 83735 ASSAY OF MAGNESIUM: CPT

## 2022-01-01 DEVICE — POWERPORT IMPLANTABLE PORT WITH ATTACHABLE 8F CHRONOFLEX OPEN-ENDED SINGLE-LUMEN VENOUS CATHETER INTERMEDIATE KIT (WITHOUT SUTURE PLUGS)
Type: IMPLANTABLE DEVICE | Site: CHEST | Status: FUNCTIONAL
Brand: POWERPORT, CHRONOFLEX

## 2022-01-01 RX ORDER — ONDANSETRON 2 MG/ML
4 INJECTION INTRAMUSCULAR; INTRAVENOUS
Status: DISCONTINUED | OUTPATIENT
Start: 2022-01-01 | End: 2022-01-01 | Stop reason: HOSPADM

## 2022-01-01 RX ORDER — SODIUM CHLORIDE 9 MG/ML
5-250 INJECTION, SOLUTION INTRAVENOUS AS NEEDED
Status: DISPENSED | OUTPATIENT
Start: 2022-01-01 | End: 2022-01-01

## 2022-01-01 RX ORDER — SODIUM CHLORIDE 9 MG/ML
5-250 INJECTION, SOLUTION INTRAVENOUS AS NEEDED
Status: CANCELLED | OUTPATIENT
Start: 2022-07-29

## 2022-01-01 RX ORDER — ONDANSETRON 2 MG/ML
4 INJECTION INTRAMUSCULAR; INTRAVENOUS ONCE
Status: COMPLETED | OUTPATIENT
Start: 2022-01-01 | End: 2022-01-01

## 2022-01-01 RX ORDER — SODIUM BICARBONATE 1 MEQ/ML
100 SYRINGE (ML) INTRAVENOUS ONCE
Status: COMPLETED | OUTPATIENT
Start: 2022-01-01 | End: 2022-01-01

## 2022-01-01 RX ORDER — SODIUM CHLORIDE 0.9 % (FLUSH) 0.9 %
5-10 SYRINGE (ML) INJECTION AS NEEDED
Status: DISCONTINUED | OUTPATIENT
Start: 2022-01-01 | End: 2022-01-01

## 2022-01-01 RX ORDER — MIDAZOLAM HYDROCHLORIDE 1 MG/ML
INJECTION, SOLUTION INTRAMUSCULAR; INTRAVENOUS
Status: DISCONTINUED
Start: 2022-01-01 | End: 2022-01-01 | Stop reason: HOSPADM

## 2022-01-01 RX ORDER — EPINEPHRINE 1 MG/ML
0.3 INJECTION, SOLUTION, CONCENTRATE INTRAVENOUS AS NEEDED
Status: CANCELLED | OUTPATIENT
Start: 2022-07-29

## 2022-01-01 RX ORDER — HYDROCORTISONE SODIUM SUCCINATE 100 MG/2ML
100 INJECTION, POWDER, FOR SOLUTION INTRAMUSCULAR; INTRAVENOUS AS NEEDED
Status: CANCELLED | OUTPATIENT
Start: 2022-01-01

## 2022-01-01 RX ORDER — SODIUM CHLORIDE 9 MG/ML
5-250 INJECTION, SOLUTION INTRAVENOUS AS NEEDED
Status: CANCELLED | OUTPATIENT
Start: 2022-01-01

## 2022-01-01 RX ORDER — VANCOMYCIN 2 GRAM/500 ML IN 0.9 % SODIUM CHLORIDE INTRAVENOUS
2000 ONCE
Status: DISCONTINUED | OUTPATIENT
Start: 2022-01-01 | End: 2022-01-01 | Stop reason: DRUGHIGH

## 2022-01-01 RX ORDER — DEXAMETHASONE SODIUM PHOSPHATE 4 MG/ML
INJECTION, SOLUTION INTRA-ARTICULAR; INTRALESIONAL; INTRAMUSCULAR; INTRAVENOUS; SOFT TISSUE AS NEEDED
Status: DISCONTINUED | OUTPATIENT
Start: 2022-01-01 | End: 2022-01-01 | Stop reason: HOSPADM

## 2022-01-01 RX ORDER — ACETAMINOPHEN 650 MG/1
650 SUPPOSITORY RECTAL
Status: DISCONTINUED | OUTPATIENT
Start: 2022-01-01 | End: 2022-01-01 | Stop reason: HOSPADM

## 2022-01-01 RX ORDER — SODIUM CHLORIDE 0.9 % (FLUSH) 0.9 %
5-40 SYRINGE (ML) INJECTION AS NEEDED
Status: CANCELLED | OUTPATIENT
Start: 2022-07-29

## 2022-01-01 RX ORDER — ONDANSETRON 2 MG/ML
8 INJECTION INTRAMUSCULAR; INTRAVENOUS AS NEEDED
Status: CANCELLED | OUTPATIENT
Start: 2022-01-01

## 2022-01-01 RX ORDER — PROPOFOL 10 MG/ML
0-50 VIAL (ML) INTRAVENOUS
Status: DISCONTINUED | OUTPATIENT
Start: 2022-01-01 | End: 2022-01-01

## 2022-01-01 RX ORDER — CLINDAMYCIN PHOSPHATE 900 MG/50ML
INJECTION, SOLUTION INTRAVENOUS
Status: COMPLETED
Start: 2022-01-01 | End: 2022-01-01

## 2022-01-01 RX ORDER — SODIUM CHLORIDE 0.9 % (FLUSH) 0.9 %
5-40 SYRINGE (ML) INJECTION AS NEEDED
Status: CANCELLED | OUTPATIENT
Start: 2022-01-01

## 2022-01-01 RX ORDER — ACETAMINOPHEN 325 MG/1
650 TABLET ORAL AS NEEDED
Status: CANCELLED
Start: 2022-01-01

## 2022-01-01 RX ORDER — SODIUM CHLORIDE 9 MG/ML
5-40 INJECTION INTRAMUSCULAR; INTRAVENOUS; SUBCUTANEOUS AS NEEDED
Status: CANCELLED | OUTPATIENT
Start: 2022-01-01

## 2022-01-01 RX ORDER — ALBUTEROL SULFATE 0.83 MG/ML
2.5 SOLUTION RESPIRATORY (INHALATION) AS NEEDED
Status: CANCELLED
Start: 2022-07-29

## 2022-01-01 RX ORDER — BARIUM SULFATE 20 MG/ML
900 SUSPENSION ORAL
Status: COMPLETED | OUTPATIENT
Start: 2022-01-01 | End: 2022-01-01

## 2022-01-01 RX ORDER — ONDANSETRON 2 MG/ML
4 INJECTION INTRAMUSCULAR; INTRAVENOUS
Status: DISCONTINUED | OUTPATIENT
Start: 2022-01-01 | End: 2022-01-01 | Stop reason: SDUPTHER

## 2022-01-01 RX ORDER — ACETAMINOPHEN 325 MG/1
650 TABLET ORAL
Status: CANCELLED | OUTPATIENT
Start: 2022-07-29

## 2022-01-01 RX ORDER — OXYCODONE AND ACETAMINOPHEN 5; 325 MG/1; MG/1
1 TABLET ORAL
Status: DISCONTINUED | OUTPATIENT
Start: 2022-01-01 | End: 2022-01-01 | Stop reason: HOSPADM

## 2022-01-01 RX ORDER — DIPHENHYDRAMINE HYDROCHLORIDE 50 MG/ML
25 INJECTION, SOLUTION INTRAMUSCULAR; INTRAVENOUS AS NEEDED
Status: CANCELLED
Start: 2022-01-01

## 2022-01-01 RX ORDER — SODIUM CHLORIDE 9 MG/ML
5-250 INJECTION, SOLUTION INTRAVENOUS AS NEEDED
Status: CANCELLED | OUTPATIENT
Start: 2022-08-19

## 2022-01-01 RX ORDER — PROPOFOL 10 MG/ML
INJECTION, EMULSION INTRAVENOUS AS NEEDED
Status: DISCONTINUED | OUTPATIENT
Start: 2022-01-01 | End: 2022-01-01 | Stop reason: HOSPADM

## 2022-01-01 RX ORDER — OXYCODONE AND ACETAMINOPHEN 5; 325 MG/1; MG/1
1 TABLET ORAL
Qty: 30 TABLET | Refills: 0 | Status: SHIPPED | OUTPATIENT
Start: 2022-01-01 | End: 2022-01-01

## 2022-01-01 RX ORDER — ONDANSETRON 4 MG/1
4 TABLET, ORALLY DISINTEGRATING ORAL
Status: DISCONTINUED | OUTPATIENT
Start: 2022-01-01 | End: 2022-01-01

## 2022-01-01 RX ORDER — HEPARIN SODIUM 5000 [USP'U]/ML
5000 INJECTION, SOLUTION INTRAVENOUS; SUBCUTANEOUS EVERY 12 HOURS
Status: DISCONTINUED | OUTPATIENT
Start: 2022-01-01 | End: 2022-01-01 | Stop reason: HOSPADM

## 2022-01-01 RX ORDER — HYDROCODONE BITARTRATE AND ACETAMINOPHEN 5; 325 MG/1; MG/1
1 TABLET ORAL
Qty: 30 TABLET | Refills: 0 | Status: SHIPPED | OUTPATIENT
Start: 2022-01-01 | End: 2022-01-01 | Stop reason: ALTCHOICE

## 2022-01-01 RX ORDER — EPINEPHRINE 1 MG/ML
0.3 INJECTION, SOLUTION, CONCENTRATE INTRAVENOUS AS NEEDED
Status: CANCELLED | OUTPATIENT
Start: 2022-01-01

## 2022-01-01 RX ORDER — ACETAMINOPHEN 325 MG/1
650 TABLET ORAL
Status: DISCONTINUED | OUTPATIENT
Start: 2022-01-01 | End: 2022-01-01 | Stop reason: HOSPADM

## 2022-01-01 RX ORDER — DROPERIDOL 2.5 MG/ML
0.62 INJECTION, SOLUTION INTRAMUSCULAR; INTRAVENOUS AS NEEDED
Status: DISCONTINUED | OUTPATIENT
Start: 2022-01-01 | End: 2022-01-01 | Stop reason: HOSPADM

## 2022-01-01 RX ORDER — SODIUM CHLORIDE 9 MG/ML
5-40 INJECTION INTRAMUSCULAR; INTRAVENOUS; SUBCUTANEOUS AS NEEDED
Status: CANCELLED | OUTPATIENT
Start: 2022-08-19

## 2022-01-01 RX ORDER — HEPARIN 100 UNIT/ML
500 SYRINGE INTRAVENOUS AS NEEDED
Status: CANCELLED
Start: 2022-01-01

## 2022-01-01 RX ORDER — DEXAMETHASONE SODIUM PHOSPHATE 100 MG/10ML
10 INJECTION INTRAMUSCULAR; INTRAVENOUS ONCE
Status: CANCELLED | OUTPATIENT
Start: 2022-01-01 | End: 2022-01-01

## 2022-01-01 RX ORDER — DIPHENHYDRAMINE HYDROCHLORIDE 50 MG/ML
50 INJECTION, SOLUTION INTRAMUSCULAR; INTRAVENOUS
Status: CANCELLED | OUTPATIENT
Start: 2022-08-19

## 2022-01-01 RX ORDER — ALBUTEROL SULFATE 0.83 MG/ML
2.5 SOLUTION RESPIRATORY (INHALATION) AS NEEDED
Status: CANCELLED
Start: 2022-08-19

## 2022-01-01 RX ORDER — SODIUM CHLORIDE 9 MG/ML
5-40 INJECTION INTRAMUSCULAR; INTRAVENOUS; SUBCUTANEOUS AS NEEDED
Status: CANCELLED | OUTPATIENT
Start: 2022-07-29

## 2022-01-01 RX ORDER — HEPARIN 100 UNIT/ML
500 SYRINGE INTRAVENOUS AS NEEDED
Status: DISCONTINUED | OUTPATIENT
Start: 2022-01-01 | End: 2022-01-01 | Stop reason: HOSPADM

## 2022-01-01 RX ORDER — SODIUM CHLORIDE 0.9 % (FLUSH) 0.9 %
5-40 SYRINGE (ML) INJECTION AS NEEDED
Status: CANCELLED | OUTPATIENT
Start: 2022-08-19

## 2022-01-01 RX ORDER — POLYETHYLENE GLYCOL 3350 17 G/17G
17 POWDER, FOR SOLUTION ORAL DAILY PRN
Status: DISCONTINUED | OUTPATIENT
Start: 2022-01-01 | End: 2022-01-01 | Stop reason: HOSPADM

## 2022-01-01 RX ORDER — LIDOCAINE 4 G/100G
2 PATCH TOPICAL EVERY 24 HOURS
Status: DISCONTINUED | OUTPATIENT
Start: 2022-01-01 | End: 2022-01-01 | Stop reason: HOSPADM

## 2022-01-01 RX ORDER — DIPHENHYDRAMINE HYDROCHLORIDE 50 MG/ML
50 INJECTION, SOLUTION INTRAMUSCULAR; INTRAVENOUS AS NEEDED
Status: DISCONTINUED | OUTPATIENT
Start: 2022-01-01 | End: 2022-01-01 | Stop reason: HOSPADM

## 2022-01-01 RX ORDER — MORPHINE SULFATE 10 MG/ML
2 INJECTION, SOLUTION INTRAMUSCULAR; INTRAVENOUS
Status: DISCONTINUED | OUTPATIENT
Start: 2022-01-01 | End: 2022-01-01 | Stop reason: HOSPADM

## 2022-01-01 RX ORDER — ALBUMIN HUMAN 50 G/1000ML
25 SOLUTION INTRAVENOUS ONCE
Status: COMPLETED | OUTPATIENT
Start: 2022-01-01 | End: 2022-01-01

## 2022-01-01 RX ORDER — DIPHENHYDRAMINE HYDROCHLORIDE 50 MG/ML
50 INJECTION, SOLUTION INTRAMUSCULAR; INTRAVENOUS AS NEEDED
Status: CANCELLED
Start: 2022-08-19

## 2022-01-01 RX ORDER — HEPARIN 100 UNIT/ML
500 SYRINGE INTRAVENOUS AS NEEDED
Status: CANCELLED
Start: 2022-07-29

## 2022-01-01 RX ORDER — LORAZEPAM 1 MG/1
.5-1 TABLET ORAL
COMMUNITY

## 2022-01-01 RX ORDER — SODIUM BICARBONATE 1 MEQ/ML
SYRINGE (ML) INTRAVENOUS
Status: DISCONTINUED
Start: 2022-01-01 | End: 2022-01-01 | Stop reason: HOSPADM

## 2022-01-01 RX ORDER — DIPHENHYDRAMINE HYDROCHLORIDE 50 MG/ML
50 INJECTION, SOLUTION INTRAMUSCULAR; INTRAVENOUS AS NEEDED
Status: CANCELLED
Start: 2022-01-01

## 2022-01-01 RX ORDER — DEXTROAMPHETAMINE SACCHARATE, AMPHETAMINE ASPARTATE, DEXTROAMPHETAMINE SULFATE AND AMPHETAMINE SULFATE 7.5; 7.5; 7.5; 7.5 MG/1; MG/1; MG/1; MG/1
30 TABLET ORAL 2 TIMES DAILY
COMMUNITY

## 2022-01-01 RX ORDER — SODIUM CHLORIDE, SODIUM LACTATE, POTASSIUM CHLORIDE, CALCIUM CHLORIDE 600; 310; 30; 20 MG/100ML; MG/100ML; MG/100ML; MG/100ML
25 INJECTION, SOLUTION INTRAVENOUS CONTINUOUS
Status: DISCONTINUED | OUTPATIENT
Start: 2022-01-01 | End: 2022-01-01 | Stop reason: HOSPADM

## 2022-01-01 RX ORDER — SODIUM CHLORIDE 9 MG/ML
5-40 INJECTION INTRAMUSCULAR; INTRAVENOUS; SUBCUTANEOUS AS NEEDED
Status: ACTIVE | OUTPATIENT
Start: 2022-01-01 | End: 2022-01-01

## 2022-01-01 RX ORDER — FENTANYL CITRATE 50 UG/ML
25 INJECTION, SOLUTION INTRAMUSCULAR; INTRAVENOUS
Status: DISCONTINUED | OUTPATIENT
Start: 2022-01-01 | End: 2022-01-01 | Stop reason: HOSPADM

## 2022-01-01 RX ORDER — SODIUM CHLORIDE 0.9 % (FLUSH) 0.9 %
5-40 SYRINGE (ML) INJECTION AS NEEDED
Status: DISCONTINUED | OUTPATIENT
Start: 2022-01-01 | End: 2022-01-01 | Stop reason: HOSPADM

## 2022-01-01 RX ORDER — SODIUM BICARBONATE 1 MEQ/ML
50 SYRINGE (ML) INTRAVENOUS ONCE
Status: COMPLETED | OUTPATIENT
Start: 2022-01-01 | End: 2022-01-01

## 2022-01-01 RX ORDER — FENTANYL CITRATE 50 UG/ML
INJECTION, SOLUTION INTRAMUSCULAR; INTRAVENOUS AS NEEDED
Status: DISCONTINUED | OUTPATIENT
Start: 2022-01-01 | End: 2022-01-01 | Stop reason: HOSPADM

## 2022-01-01 RX ORDER — ONDANSETRON 2 MG/ML
8 INJECTION INTRAMUSCULAR; INTRAVENOUS AS NEEDED
Status: CANCELLED | OUTPATIENT
Start: 2022-08-19

## 2022-01-01 RX ORDER — SODIUM CHLORIDE 0.9 % (FLUSH) 0.9 %
5-40 SYRINGE (ML) INJECTION EVERY 8 HOURS
Status: DISCONTINUED | OUTPATIENT
Start: 2022-01-01 | End: 2022-01-01 | Stop reason: HOSPADM

## 2022-01-01 RX ORDER — ENOXAPARIN SODIUM 100 MG/ML
30 INJECTION SUBCUTANEOUS DAILY
Status: DISCONTINUED | OUTPATIENT
Start: 2022-01-01 | End: 2022-01-01

## 2022-01-01 RX ORDER — MORPHINE SULFATE 15 MG/1
15 TABLET ORAL
Qty: 60 TABLET | Refills: 0 | Status: SHIPPED | OUTPATIENT
Start: 2022-01-01 | End: 2022-07-21

## 2022-01-01 RX ORDER — HEPARIN 100 UNIT/ML
500 SYRINGE INTRAVENOUS AS NEEDED
Status: ACTIVE | OUTPATIENT
Start: 2022-01-01 | End: 2022-01-01

## 2022-01-01 RX ORDER — SODIUM CHLORIDE 9 MG/ML
5-40 INJECTION INTRAMUSCULAR; INTRAVENOUS; SUBCUTANEOUS AS NEEDED
Status: DISCONTINUED | OUTPATIENT
Start: 2022-01-01 | End: 2022-01-01 | Stop reason: HOSPADM

## 2022-01-01 RX ORDER — LEVOFLOXACIN 5 MG/ML
750 INJECTION, SOLUTION INTRAVENOUS
Status: DISCONTINUED | OUTPATIENT
Start: 2022-01-01 | End: 2022-01-01 | Stop reason: HOSPADM

## 2022-01-01 RX ORDER — ACETAMINOPHEN 325 MG/1
650 TABLET ORAL AS NEEDED
Status: CANCELLED
Start: 2022-07-29

## 2022-01-01 RX ORDER — NOREPINEPHRINE BITARTRATE/D5W 8 MG/250ML
.5-3 PLASTIC BAG, INJECTION (ML) INTRAVENOUS
Status: DISCONTINUED | OUTPATIENT
Start: 2022-01-01 | End: 2022-01-01 | Stop reason: SDUPTHER

## 2022-01-01 RX ORDER — ONDANSETRON 2 MG/ML
INJECTION INTRAMUSCULAR; INTRAVENOUS AS NEEDED
Status: DISCONTINUED | OUTPATIENT
Start: 2022-01-01 | End: 2022-01-01 | Stop reason: HOSPADM

## 2022-01-01 RX ORDER — LIDOCAINE HYDROCHLORIDE 20 MG/ML
INJECTION, SOLUTION EPIDURAL; INFILTRATION; INTRACAUDAL; PERINEURAL AS NEEDED
Status: DISCONTINUED | OUTPATIENT
Start: 2022-01-01 | End: 2022-01-01 | Stop reason: HOSPADM

## 2022-01-01 RX ORDER — ENOXAPARIN SODIUM 100 MG/ML
40 INJECTION SUBCUTANEOUS DAILY
Status: DISCONTINUED | OUTPATIENT
Start: 2022-01-01 | End: 2022-01-01

## 2022-01-01 RX ORDER — IPRATROPIUM BROMIDE AND ALBUTEROL SULFATE 2.5; .5 MG/3ML; MG/3ML
3 SOLUTION RESPIRATORY (INHALATION)
Status: DISCONTINUED | OUTPATIENT
Start: 2022-01-01 | End: 2022-01-01 | Stop reason: HOSPADM

## 2022-01-01 RX ORDER — HYDROCODONE BITARTRATE AND ACETAMINOPHEN 5; 325 MG/1; MG/1
1 TABLET ORAL
Qty: 30 TABLET | Refills: 0 | Status: SHIPPED | OUTPATIENT
Start: 2022-01-01 | End: 2022-01-01 | Stop reason: SDUPTHER

## 2022-01-01 RX ORDER — ALBUMIN HUMAN 250 G/1000ML
12.5 SOLUTION INTRAVENOUS EVERY 6 HOURS
Status: DISCONTINUED | OUTPATIENT
Start: 2022-01-01 | End: 2022-01-01 | Stop reason: HOSPADM

## 2022-01-01 RX ORDER — NALOXONE HYDROCHLORIDE 4 MG/.1ML
SPRAY NASAL
Qty: 1 EACH | Refills: 1 | Status: SHIPPED | OUTPATIENT
Start: 2022-01-01

## 2022-01-01 RX ORDER — CLINDAMYCIN PHOSPHATE 900 MG/50ML
900 INJECTION, SOLUTION INTRAVENOUS ONCE
Status: COMPLETED | OUTPATIENT
Start: 2022-01-01 | End: 2022-01-01

## 2022-01-01 RX ORDER — HALOPERIDOL 5 MG/ML
2.5 INJECTION INTRAMUSCULAR ONCE
Status: COMPLETED | OUTPATIENT
Start: 2022-01-01 | End: 2022-01-01

## 2022-01-01 RX ORDER — DIPHENHYDRAMINE HYDROCHLORIDE 50 MG/ML
25 INJECTION, SOLUTION INTRAMUSCULAR; INTRAVENOUS AS NEEDED
Status: CANCELLED
Start: 2022-07-29

## 2022-01-01 RX ORDER — ALBUTEROL SULFATE 0.83 MG/ML
2.5 SOLUTION RESPIRATORY (INHALATION) AS NEEDED
Status: CANCELLED
Start: 2022-01-01

## 2022-01-01 RX ORDER — METRONIDAZOLE 500 MG/100ML
500 INJECTION, SOLUTION INTRAVENOUS EVERY 8 HOURS
Status: DISCONTINUED | OUTPATIENT
Start: 2022-01-01 | End: 2022-01-01 | Stop reason: HOSPADM

## 2022-01-01 RX ORDER — HYDROCORTISONE SODIUM SUCCINATE 100 MG/2ML
100 INJECTION, POWDER, FOR SOLUTION INTRAMUSCULAR; INTRAVENOUS AS NEEDED
Status: CANCELLED | OUTPATIENT
Start: 2022-08-19

## 2022-01-01 RX ORDER — ONDANSETRON 2 MG/ML
8 INJECTION INTRAMUSCULAR; INTRAVENOUS AS NEEDED
Status: DISCONTINUED | OUTPATIENT
Start: 2022-01-01 | End: 2022-01-01 | Stop reason: HOSPADM

## 2022-01-01 RX ORDER — GLYCOPYRROLATE 0.2 MG/ML
INJECTION INTRAMUSCULAR; INTRAVENOUS AS NEEDED
Status: DISCONTINUED | OUTPATIENT
Start: 2022-01-01 | End: 2022-01-01 | Stop reason: HOSPADM

## 2022-01-01 RX ORDER — DEXAMETHASONE SODIUM PHOSPHATE 100 MG/10ML
10 INJECTION INTRAMUSCULAR; INTRAVENOUS ONCE
Status: CANCELLED | OUTPATIENT
Start: 2022-07-29 | End: 2022-07-29

## 2022-01-01 RX ORDER — ACETAMINOPHEN 325 MG/1
650 TABLET ORAL AS NEEDED
Status: CANCELLED
Start: 2022-08-19

## 2022-01-01 RX ORDER — HALOPERIDOL 5 MG/ML
5 INJECTION INTRAMUSCULAR
Status: DISCONTINUED | OUTPATIENT
Start: 2022-01-01 | End: 2022-01-01

## 2022-01-01 RX ORDER — PROCHLORPERAZINE MALEATE 5 MG
5 TABLET ORAL
Status: DISCONTINUED | OUTPATIENT
Start: 2022-01-01 | End: 2022-01-01 | Stop reason: HOSPADM

## 2022-01-01 RX ORDER — SODIUM CHLORIDE 0.9 % (FLUSH) 0.9 %
5-40 SYRINGE (ML) INJECTION AS NEEDED
Status: DISPENSED | OUTPATIENT
Start: 2022-01-01 | End: 2022-01-01

## 2022-01-01 RX ORDER — DIPHENHYDRAMINE HYDROCHLORIDE 50 MG/ML
50 INJECTION, SOLUTION INTRAMUSCULAR; INTRAVENOUS AS NEEDED
Status: CANCELLED
Start: 2022-07-29

## 2022-01-01 RX ORDER — DIPHENHYDRAMINE HYDROCHLORIDE 50 MG/ML
25 INJECTION, SOLUTION INTRAMUSCULAR; INTRAVENOUS AS NEEDED
Status: CANCELLED
Start: 2022-08-19

## 2022-01-01 RX ORDER — HEPARIN 100 UNIT/ML
500 SYRINGE INTRAVENOUS AS NEEDED
Status: CANCELLED
Start: 2022-08-19

## 2022-01-01 RX ORDER — CITALOPRAM 20 MG/1
20 TABLET, FILM COATED ORAL DAILY
Status: DISCONTINUED | OUTPATIENT
Start: 2022-01-01 | End: 2022-01-01 | Stop reason: HOSPADM

## 2022-01-01 RX ORDER — ONDANSETRON 2 MG/ML
8 INJECTION INTRAMUSCULAR; INTRAVENOUS AS NEEDED
Status: CANCELLED | OUTPATIENT
Start: 2022-07-29

## 2022-01-01 RX ORDER — HYDROCORTISONE SODIUM SUCCINATE 100 MG/2ML
100 INJECTION, POWDER, FOR SOLUTION INTRAMUSCULAR; INTRAVENOUS AS NEEDED
Status: CANCELLED | OUTPATIENT
Start: 2022-07-29

## 2022-01-01 RX ORDER — HYDROCORTISONE SODIUM SUCCINATE 100 MG/2ML
100 INJECTION, POWDER, FOR SOLUTION INTRAMUSCULAR; INTRAVENOUS AS NEEDED
Status: DISCONTINUED | OUTPATIENT
Start: 2022-01-01 | End: 2022-01-01 | Stop reason: HOSPADM

## 2022-01-01 RX ORDER — NOREPINEPHRINE BITARTRATE/D5W 8 MG/250ML
.5-2 PLASTIC BAG, INJECTION (ML) INTRAVENOUS
Status: DISCONTINUED | OUTPATIENT
Start: 2022-01-01 | End: 2022-01-01 | Stop reason: HOSPADM

## 2022-01-01 RX ORDER — ACETAMINOPHEN 325 MG/1
650 TABLET ORAL
Status: CANCELLED | OUTPATIENT
Start: 2022-08-19

## 2022-01-01 RX ORDER — PROPOFOL 10 MG/ML
INJECTION, EMULSION INTRAVENOUS
Status: DISCONTINUED
Start: 2022-01-01 | End: 2022-01-01 | Stop reason: HOSPADM

## 2022-01-01 RX ORDER — DEXAMETHASONE 0.5 MG/1
0.5 TABLET ORAL 4 TIMES DAILY
Status: DISCONTINUED | OUTPATIENT
Start: 2022-01-01 | End: 2022-01-01 | Stop reason: HOSPADM

## 2022-01-01 RX ORDER — EPINEPHRINE 1 MG/ML
0.3 INJECTION, SOLUTION, CONCENTRATE INTRAVENOUS AS NEEDED
Status: CANCELLED | OUTPATIENT
Start: 2022-08-19

## 2022-01-01 RX ORDER — ALBUMIN HUMAN 250 G/1000ML
12.5 SOLUTION INTRAVENOUS ONCE
Status: COMPLETED | OUTPATIENT
Start: 2022-01-01 | End: 2022-01-01

## 2022-01-01 RX ORDER — MIDAZOLAM HYDROCHLORIDE 1 MG/ML
INJECTION, SOLUTION INTRAMUSCULAR; INTRAVENOUS AS NEEDED
Status: DISCONTINUED | OUTPATIENT
Start: 2022-01-01 | End: 2022-01-01 | Stop reason: HOSPADM

## 2022-01-01 RX ORDER — DIPHENHYDRAMINE HYDROCHLORIDE 50 MG/ML
50 INJECTION, SOLUTION INTRAMUSCULAR; INTRAVENOUS
Status: CANCELLED | OUTPATIENT
Start: 2022-07-29

## 2022-01-01 RX ORDER — ONDANSETRON 4 MG/1
4 TABLET, ORALLY DISINTEGRATING ORAL
Status: DISCONTINUED | OUTPATIENT
Start: 2022-01-01 | End: 2022-01-01 | Stop reason: SDUPTHER

## 2022-01-01 RX ORDER — MORPHINE SULFATE 15 MG/1
15 TABLET ORAL
Status: DISCONTINUED | OUTPATIENT
Start: 2022-01-01 | End: 2022-01-01 | Stop reason: HOSPADM

## 2022-01-01 RX ORDER — NALOXONE HYDROCHLORIDE 0.4 MG/ML
0.4 INJECTION, SOLUTION INTRAMUSCULAR; INTRAVENOUS; SUBCUTANEOUS AS NEEDED
Status: DISCONTINUED | OUTPATIENT
Start: 2022-01-01 | End: 2022-01-01 | Stop reason: HOSPADM

## 2022-01-01 RX ORDER — SODIUM CHLORIDE 9 MG/ML
5-250 INJECTION, SOLUTION INTRAVENOUS AS NEEDED
Status: DISCONTINUED | OUTPATIENT
Start: 2022-01-01 | End: 2022-01-01 | Stop reason: HOSPADM

## 2022-01-01 RX ORDER — DIPHENHYDRAMINE HYDROCHLORIDE 50 MG/ML
12.5 INJECTION, SOLUTION INTRAMUSCULAR; INTRAVENOUS AS NEEDED
Status: DISCONTINUED | OUTPATIENT
Start: 2022-01-01 | End: 2022-01-01 | Stop reason: HOSPADM

## 2022-01-01 RX ORDER — LIDOCAINE HYDROCHLORIDE 10 MG/ML
0.1 INJECTION, SOLUTION EPIDURAL; INFILTRATION; INTRACAUDAL; PERINEURAL AS NEEDED
Status: DISCONTINUED | OUTPATIENT
Start: 2022-01-01 | End: 2022-01-01 | Stop reason: HOSPADM

## 2022-01-01 RX ORDER — HYDROMORPHONE HYDROCHLORIDE 1 MG/ML
.2-.5 INJECTION, SOLUTION INTRAMUSCULAR; INTRAVENOUS; SUBCUTANEOUS ONCE
Status: DISCONTINUED | OUTPATIENT
Start: 2022-01-01 | End: 2022-01-01 | Stop reason: HOSPADM

## 2022-01-01 RX ORDER — DEXAMETHASONE SODIUM PHOSPHATE 100 MG/10ML
10 INJECTION INTRAMUSCULAR; INTRAVENOUS ONCE
Status: CANCELLED | OUTPATIENT
Start: 2022-08-19 | End: 2022-08-19

## 2022-01-01 RX ORDER — DEXAMETHASONE 0.5 MG/5ML
1 ELIXIR ORAL 4 TIMES DAILY
Qty: 400 ML | Refills: 0 | Status: SHIPPED | OUTPATIENT
Start: 2022-01-01 | End: 2022-07-21

## 2022-01-01 RX ORDER — LORAZEPAM 0.5 MG/1
.5-1 TABLET ORAL
Status: DISCONTINUED | OUTPATIENT
Start: 2022-01-01 | End: 2022-01-01 | Stop reason: HOSPADM

## 2022-01-01 RX ORDER — ONDANSETRON 4 MG/1
4 TABLET, ORALLY DISINTEGRATING ORAL
Status: DISCONTINUED | OUTPATIENT
Start: 2022-01-01 | End: 2022-01-01 | Stop reason: HOSPADM

## 2022-01-01 RX ADMIN — PROCHLORPERAZINE MALEATE 5 MG: 10 TABLET ORAL at 04:21

## 2022-01-01 RX ADMIN — HEPARIN 500 UNITS: 100 SYRINGE at 16:00

## 2022-01-01 RX ADMIN — ALBUMIN (HUMAN) 25 G: 12.5 INJECTION, SOLUTION INTRAVENOUS at 20:13

## 2022-01-01 RX ADMIN — SODIUM CHLORIDE 1000 ML: 9 INJECTION, SOLUTION INTRAVENOUS at 14:47

## 2022-01-01 RX ADMIN — HEPARIN 500 UNITS: 100 SYRINGE at 16:30

## 2022-01-01 RX ADMIN — FENTANYL CITRATE 50 MCG: 50 INJECTION, SOLUTION INTRAMUSCULAR; INTRAVENOUS at 09:17

## 2022-01-01 RX ADMIN — SODIUM CHLORIDE 2500 MG: 9 INJECTION, SOLUTION INTRAVENOUS at 00:46

## 2022-01-01 RX ADMIN — BARIUM SULFATE 450 ML: 21 SUSPENSION ORAL at 10:36

## 2022-01-01 RX ADMIN — DOCETAXEL 185 MG: 10 INJECTION, SOLUTION INTRAVENOUS at 15:15

## 2022-01-01 RX ADMIN — ZOLEDRONIC ACID 4 MG: 0.04 INJECTION, SOLUTION INTRAVENOUS at 14:15

## 2022-01-01 RX ADMIN — TRASTUZUMAB 1126 MG: 150 INJECTION, POWDER, LYOPHILIZED, FOR SOLUTION INTRAVENOUS at 11:20

## 2022-01-01 RX ADMIN — SODIUM CHLORIDE, PRESERVATIVE FREE 10 ML: 5 INJECTION INTRAVENOUS at 16:00

## 2022-01-01 RX ADMIN — FENTANYL CITRATE 50 MCG: 50 INJECTION, SOLUTION INTRAMUSCULAR; INTRAVENOUS at 09:48

## 2022-01-01 RX ADMIN — PERTUZUMAB 840 MG: 30 INJECTION, SOLUTION, CONCENTRATE INTRAVENOUS at 13:10

## 2022-01-01 RX ADMIN — SODIUM BICARBONATE: 84 INJECTION, SOLUTION INTRAVENOUS at 22:45

## 2022-01-01 RX ADMIN — SODIUM CHLORIDE 25 ML/HR: 9 INJECTION, SOLUTION INTRAVENOUS at 11:16

## 2022-01-01 RX ADMIN — ONDANSETRON HYDROCHLORIDE 4 MG: 2 INJECTION, SOLUTION INTRAMUSCULAR; INTRAVENOUS at 09:20

## 2022-01-01 RX ADMIN — FENTANYL CITRATE 25 MCG: 50 INJECTION, SOLUTION INTRAMUSCULAR; INTRAVENOUS at 10:20

## 2022-01-01 RX ADMIN — SODIUM CHLORIDE, PRESERVATIVE FREE 10 ML: 5 INJECTION INTRAVENOUS at 08:25

## 2022-01-01 RX ADMIN — IOPAMIDOL 100 ML: 755 INJECTION, SOLUTION INTRAVENOUS at 18:25

## 2022-01-01 RX ADMIN — METRONIDAZOLE 500 MG: 500 INJECTION, SOLUTION INTRAVENOUS at 22:45

## 2022-01-01 RX ADMIN — SODIUM BICARBONATE 50 MEQ: 84 INJECTION, SOLUTION INTRAVENOUS at 00:29

## 2022-01-01 RX ADMIN — CLINDAMYCIN PHOSPHATE 900 MG: 18 INJECTION, SOLUTION INTRAVENOUS at 09:01

## 2022-01-01 RX ADMIN — METRONIDAZOLE 500 MG: 500 INJECTION, SOLUTION INTRAVENOUS at 05:18

## 2022-01-01 RX ADMIN — CEFEPIME 2 G: 2 INJECTION, POWDER, FOR SOLUTION INTRAVENOUS at 19:26

## 2022-01-01 RX ADMIN — ALBUMIN (HUMAN) 12.5 G: 0.25 INJECTION, SOLUTION INTRAVENOUS at 04:21

## 2022-01-01 RX ADMIN — MIDAZOLAM HYDROCHLORIDE 2 MG: 1 INJECTION, SOLUTION INTRAMUSCULAR; INTRAVENOUS at 09:01

## 2022-01-01 RX ADMIN — FENTANYL CITRATE 25 MCG: 50 INJECTION, SOLUTION INTRAMUSCULAR; INTRAVENOUS at 10:33

## 2022-01-01 RX ADMIN — LIDOCAINE HYDROCHLORIDE 80 MG: 20 INJECTION, SOLUTION EPIDURAL; INFILTRATION; INTRACAUDAL; PERINEURAL at 09:10

## 2022-01-01 RX ADMIN — SODIUM CHLORIDE, PRESERVATIVE FREE 10 ML: 5 INJECTION INTRAVENOUS at 00:46

## 2022-01-01 RX ADMIN — PROPOFOL 300 MG: 10 INJECTION, EMULSION INTRAVENOUS at 09:10

## 2022-01-01 RX ADMIN — SODIUM CHLORIDE, PRESERVATIVE FREE 10 ML: 5 INJECTION INTRAVENOUS at 14:46

## 2022-01-01 RX ADMIN — SODIUM CHLORIDE, POTASSIUM CHLORIDE, SODIUM LACTATE AND CALCIUM CHLORIDE 25 ML/HR: 600; 310; 30; 20 INJECTION, SOLUTION INTRAVENOUS at 08:06

## 2022-01-01 RX ADMIN — DEXAMETHASONE SODIUM PHOSPHATE 20 MG: 4 INJECTION, SOLUTION INTRAMUSCULAR; INTRAVENOUS at 14:30

## 2022-01-01 RX ADMIN — DEXAMETHASONE SODIUM PHOSPHATE 8 MG: 4 INJECTION, SOLUTION INTRAMUSCULAR; INTRAVENOUS at 09:20

## 2022-01-01 RX ADMIN — GLYCOPYRROLATE 0.2 MG: 0.2 INJECTION, SOLUTION INTRAMUSCULAR; INTRAVENOUS at 09:01

## 2022-01-01 RX ADMIN — IOPAMIDOL 100 ML: 755 INJECTION, SOLUTION INTRAVENOUS at 10:36

## 2022-01-01 RX ADMIN — SODIUM CHLORIDE 500 ML: 9 INJECTION, SOLUTION INTRAVENOUS at 19:34

## 2022-01-01 RX ADMIN — HALOPERIDOL LACTATE 2.5 MG: 5 INJECTION, SOLUTION INTRAMUSCULAR at 05:21

## 2022-01-01 RX ADMIN — SODIUM BICARBONATE 100 MEQ: 84 INJECTION, SOLUTION INTRAVENOUS at 04:31

## 2022-01-01 RX ADMIN — ONDANSETRON 4 MG: 2 INJECTION INTRAMUSCULAR; INTRAVENOUS at 15:31

## 2022-01-01 RX ADMIN — SODIUM CHLORIDE, PRESERVATIVE FREE 10 ML: 5 INJECTION INTRAVENOUS at 05:21

## 2022-01-01 RX ADMIN — LORAZEPAM 0.5 MG: 0.5 TABLET ORAL at 01:04

## 2022-01-01 RX ADMIN — SODIUM CHLORIDE, PRESERVATIVE FREE 10 ML: 5 INJECTION INTRAVENOUS at 16:30

## 2022-01-01 RX ADMIN — MORPHINE SULFATE 15 MG: 15 TABLET ORAL at 22:50

## 2022-01-01 RX ADMIN — LEVOFLOXACIN 750 MG: 5 INJECTION, SOLUTION INTRAVENOUS at 03:24

## 2022-01-01 RX ADMIN — LORAZEPAM 1 MG: 0.5 TABLET ORAL at 04:21

## 2022-01-01 RX ADMIN — DEXAMETHASONE 0.5 MG: 0.5 TABLET ORAL at 00:46

## 2022-01-01 RX ADMIN — SODIUM CHLORIDE, PRESERVATIVE FREE 10 ML: 5 INJECTION INTRAVENOUS at 07:18

## 2022-01-01 RX ADMIN — ONDANSETRON 4 MG: 2 INJECTION INTRAMUSCULAR; INTRAVENOUS at 02:39

## 2022-06-08 NOTE — TELEPHONE ENCOUNTER
Referral in [facility], stage III breast ca w/dermal mets. Dr. Mary Weir is asking for the patient to be seen asap. Please advise on scheduling.  Thanks

## 2022-06-08 NOTE — PROGRESS NOTES
HISTORY OF PRESENT ILLNESS  Yash Flores is a 39 y.o. female. HPI NEW patient consult referred by  Dr. Scott Conteh for RIGHT breast cancer. She noticed a mass in her breast and underarm, followed by axillary swelling. Family History: Maternal grandmother- breast cancer Dx at 40.  at 36      Mammogram, 22, BIRADS 5 multiple masses right breast, axilla and in skin. Biopsies performed     Path: masses and axillary node: IDC high grade ki 67 70% Er + Pr + Her 2 pos  Past Medical History:   Diagnosis Date    ADHD     Anxiety     Appetite loss     Bipolar depression (HCC)     Burning with urination     Colovesical fistula 2014    Diverticulitis     Fatigue     Fever chills     Headache(784.0)      Past Surgical History:   Procedure Laterality Date    HX COLONOSCOPY      HX HEENT      wisdom teeth    HX PARTIAL COLECTOMY      took out 1.5 ft of intestines      Social History     Socioeconomic History    Marital status: LIFE PARTNER     Spouse name: Not on file    Number of children: Not on file    Years of education: Not on file    Highest education level: Not on file   Occupational History    Not on file   Tobacco Use    Smoking status: Current Every Day Smoker     Packs/day: 0.25     Years: 10.00     Pack years: 2.50     Types: Cigarettes    Smokeless tobacco: Never Used   Vaping Use    Vaping Use: Never used   Substance and Sexual Activity    Alcohol use: No    Drug use: No    Sexual activity: Not on file   Other Topics Concern    Not on file   Social History Narrative    Not on file     Social Determinants of Health     Financial Resource Strain:     Difficulty of Paying Living Expenses: Not on file   Food Insecurity:     Worried About Running Out of Food in the Last Year: Not on file    Ana of Food in the Last Year: Not on file   Transportation Needs:     Lack of Transportation (Medical): Not on file    Lack of Transportation (Non-Medical):  Not on file   Physical Activity:     Days of Exercise per Week: Not on file    Minutes of Exercise per Session: Not on file   Stress:     Feeling of Stress : Not on file   Social Connections:     Frequency of Communication with Friends and Family: Not on file    Frequency of Social Gatherings with Friends and Family: Not on file    Attends Advent Services: Not on file    Active Member of [address] [place] [address] or Organizations: Not on file    Attends Club or Organization Meetings: Not on file    Marital Status: Not on file   Intimate Partner Violence:     Fear of Current or Ex-Partner: Not on file    Emotionally Abused: Not on file    Physically Abused: Not on file    Sexually Abused: Not on file   Housing Stability:     Unable to Pay for Housing in the Last Year: Not on file    Number of [place] in the Last Year: Not on file    Unstable Housing in the Last Year: Not on file     OB History    No obstetric history on file. Obstetric Comments   Menarche 15, LMP n/a, # of children n/a, age of 1st delivery n/a, Hysterectomy/oophorectomy no/no, Breast bx no, history of breast feeding n/a, BCP yes, Hormone therapy no             Current Outpatient Medications:     LEVONORGESTREL-ETH ESTRA (SAPPHIRE PO), Take 1 Tab by mouth every morning., Disp: , Rfl:     citalopram (CELEXA) 10 mg tablet, Take 20 mg by mouth daily. Indications: DEPRESSION ASSOCIATED WITH MANIC DEPRESSIVE DISORDER, Disp: , Rfl:     oxyCODONE-acetaminophen (PERCOCET) 5-325 mg per tablet, Take 1 Tablet by mouth every four (4) hours as needed for Pain for up to 3 days. Max Daily Amount: 6 Tablets. , Disp: 30 Tablet, Rfl: 0    dextroamphetamine-amphetamine (AdderalL) 30 mg tablet, Take 30 mg by mouth two (2) times a day., Disp: , Rfl:     oxyCODONE-acetaminophen (PERCOCET) 5-325 mg per tablet, Take 1 Tab by mouth every four (4) hours as needed. , Disp: 50 Tab, Rfl: 0    ciprofloxacin (CIPRO) 500 mg tablet, Take 500 mg by mouth two (2) times a day.  Indications: INFECTIOUS DISEASE OF ABDOMEN, Disp: , Rfl:   Allergies   Allergen Reactions    Amoxicillin Hives         Review of Systems   Psychiatric/Behavioral: Positive for depression. The patient is nervous/anxious. All other systems reviewed and are negative. Physical Exam  Vitals and nursing note reviewed. Chest:   Breasts:      Left: No swelling, bleeding, inverted nipple, mass, nipple discharge, skin change, tenderness, axillary adenopathy or supraclavicular adenopathy. Comments: Entire right breast firm, skin thickening, redness  Dermal mets in superior right breast  Axillary adenopathy on right axilla with arm swelling    Lymphadenopathy:      Upper Body:      Left upper body: No supraclavicular, axillary or pectoral adenopathy. BREAST ULTRASOUND, Preop planning  Indication:preop planning  right Breast outer and dermal mets, axilla   Technique: The area was scanned using a high-frequency linear-array near-field transducer  Findings: adenopathy and dermal mets  Impression: Biopsy site visible with ultrasound  Disposition:  Will schedule staging scans  ASSESSMENT and PLAN    ICD-10-CM ICD-9-CM    1. Breast cancer, stage 3, right ([facility] [address]  C50.911 174.9 NM BONE SCAN [facility] BODY      CT CHEST W CONT      CANCELED: CT CHEST ABD PELV W CONT     38 yo female with locally advanced breast cancer at least Stage 3 right breast IDC Er + Pr + Her 2 neg  Will need staging scans, genetic testing. Plan will be neoadjuvant chemotherapy first  Will refer to medical oncology. I will call her after her scans. Will need port. I do not see a skin punch biopsy on dermal mets and will do this at port placement. She was appreciative. 90 minutes was spent with patient on counseling and coordination of care.

## 2022-06-08 NOTE — PATIENT INSTRUCTIONS
Breast Lumps: Care Instructions  Your Care Instructions  Breast lumps are common, especially in women between ages 27 and 48. Many women's breasts feel lumpy and tender before their menstrual period. Women also may have lumps when they are breastfeeding. Breast lumps may go away after menopause. All new breast lumps in women after menopause should be checked by a doctor. Although lumps may be normal for you, it is important to have your doctor check any lump or thickness that is not like the rest of your breast to make sure it is not cancer. A lump may be larger, harder, or different from the rest of your breast tissue. Follow-up care is a key part of your treatment and safety. Be sure to make and go to all appointments, and call your doctor if you are having problems. It's also a good idea to know your test results and keep a list of the medicines you take. How can you care for yourself at home? · Make an appointment to have a mammogram and other follow-up visits as recommended by your doctor. When should you call for help? Watch closely for changes in your health, and be sure to contact your doctor if:    · You do not get better as expected.     · Your breast has changed.     · You have pain in your breast.     · You have a discharge from your nipple.     · A breast lump changes or does not go away. Where can you learn more? Go to http://www.gray.com/  Enter Q928 in the search box to learn more about \"Breast Lumps: Care Instructions. \"  Current as of: November 22, 2021               Content Version: 13.2  © 2006-2022 Healthwise, Incorporated. Care instructions adapted under license by [employer] (which disclaims liability or warranty for this information). If you have questions about a medical condition or this instruction, always ask your healthcare professional. [address] any warranty or liability for your use of this information.

## 2022-06-09 NOTE — PROGRESS NOTES
[address]  Breast Navigator Encounter    Name:    Jennifer Ruggiero  Age:    39 y.o. Diagnosis:   RIGHT breast cancer    Interdisciplinary Team:  Med-Onc:    Dr. Naun Alvarez   Surg-Onc:    Dr. Oneal Wallace:      Plastics:      :    Malvin Sebastian  Nurse Navigator:  Pershing Fleischer, RN, BSN, [facility]      Encounter type:  []Patient Initiated  [x]Navigator Follow-up []Pre-op  []Post-op  []Check-in Prior to First Treatment []Treatment Modality Change  []Initial Navigator Encounter []Other:       Narrative:    Reached out to patient after her visit with Dr. Nishant Hassan yesterday. She was referred by Dr. Alf Kirby. She has scans, an appointment with Dr. Naun Alvarez and a port insertion all scheduled in the next week or so. She was not available so I L/M for her to return my call. I did leave my contact information and explained  my role in their care. I will continue to follow the patient.           Pershing Fleischer, RN, BSN, [facility]  Oncology Breast Navigator     [address]  [address] [place] [address] [facility].  W: [phone]  F: [phone]  [email or web address]  Good Help to Those in [facility]

## 2022-06-10 NOTE — PROGRESS NOTES
[address]  Breast Navigator Encounter    Name:    Jennifer Ruggiero  Age:    39 y.o. Diagnosis:    RIGHT breast cancer    Interdisciplinary Team:  Med-Onc:    Dr. Naun Alvarez    Surg-Onc:    Dr. Oneal Wallace:      Plastics:      :    Malvin Sebastian   Nurse Navigator:  Pershing Fleischer, RN, BSN, [facility]N      Encounter type:  [x]Patient Initiated  []Navigator Follow-up []Pre-op  []Post-op  []Check-in Prior to First Treatment []Treatment Modality Change  []Initial Navigator Encounter []Other:       Narrative:    Called me today thinking she was calling Dr. Maday Mobley office. I told her that I did try to call her yesterday, and I explained my role. She has some oxycodone/acetaminophen from another physician for the pain that she is having from the lymphadenopathy and swelling in her arm. Took this a couple of times on Wednesday, then at night and then 1/2 of a pill a couple of times yesterday and developed itching. Took Advil today for the pain. Recommended that she minimize the use of the oxycodone and take Benadryl along with this when she does take it. I also told her she could continue the ibuprofen until five days or so before surgery, which is probably when PAT will recommend her stopping it. She has a port placement on 6/23. She understands and agrees with this plan. Discussed resources available to her, and right now she is not interested in any of these, however I encouraged her to reach out to me with questions/concerns or if she wants me to connect her with any resources in the future. I confirmed all of her upcoming appointments with her. Provided the patient with my contact information and discussed my role in their care. I will continue to follow the patient. Referrals/Handouts:   Talked about resources available here at [employer]. ADDENDUM:  Spoke to Dr. James Sutton after I spoke to the patient.   Called her back and L/M that she should take 50 mg of Benadryl when she takes the oxycodone. I also let her know that she can call back on Monday if she continued with the itching, and Dr. Kenneth Isabel can e-scribe another pain med for her.             Tad Oden, RN, BSN, [facility]  Oncology Breast Navigator     [address] [place] [address]   [facility] [address] [facility] [address].  W: [phone]  F: [phone]  [email or web address]  Good Help to Those in [facility]

## 2022-06-16 NOTE — PERIOP NOTES
[facility]  Ambulatory Surgery Unit  Pre-operative Instructions    Surgery/Procedure Date  Thursday June 23rd            Tentative Arrival Time TBD      1. On the day of your surgery/procedure, please report to the Ambulatory Surgery Unit Registration Desk and sign in at your designated time. The Ambulatory Surgery Unit is located in [facility] on the [facility] side of the [facility] across from the [address] [place] [address]. Please have all of your health insurance cards, co-payment, and a photo ID.    **TWO adults may accompany you the day of the procedure. We have limited seating available. If our waiting room is at capacity, your ride may be asked to remain in their vehicle. No one under 15 is allowed in the waiting room. Masks, fully covering the mouth and nose, are required in the waiting room. 2. You must have someone with you to drive you home, as you should not drive a car for 24 hours following anesthesia. Please make arrangements for a responsible adult friend or family member to stay with you for at least the first 24 hours after your surgery. 3. Do not have anything to eat or drink (including water, gum, mints, coffee, juice) after 11:59 PM  Wednesday June 22nd. This may not apply to medications prescribed by your physician. (Please note below the special instructions with medications to take the morning of surgery, if applicable.)    4. We recommend you do not drink any alcoholic beverages for 24 hours before and after your surgery. 5. Contact your surgeons office for instructions on the following medications: non-steroidal anti-inflammatory drugs (i.e. Advil, Aleve), vitamins, and supplements. (Some surgeons will want you to stop these medications prior to surgery and others may allow you to take them)   **If you are currently taking Plavix, Coumadin, Aspirin and/or other blood-thinning agents, contact your surgeon for instructions. ** Your surgeon will partner with the physician prescribing these medications to determine if it is safe to stop or if you need to continue taking. Please do not stop taking these medications without instructions from your surgeon. 6. In an effort to help prevent surgical site infection, we ask that you shower with an anti-bacterial soap (i.e. Dial/Safeguard, or the soap provided to you at your preadmission testing appointment) for 3 days prior to and on the morning of surgery, using a fresh towel after each shower. (Please begin this process with fresh bed linens.) Do not apply any lotions, powders, or deodorants after the shower on the day of your procedure. If applicable, please do not shave the operative site for 48 hours prior to surgery. 7. Wear comfortable clothes. Wear glasses instead of contacts. Do not bring any jewelry or money (other than copays or fees as instructed). Do not wear make-up, particularly mascara, the morning of your surgery. Do not wear nail polish, particularly if you are having foot /hand surgery. Wear your hair loose or down, no ponytails, buns, tata pins or clips. All body piercings must be removed. 8. You should understand that if you do not follow these instructions your surgery may be cancelled. If your physical condition changes (i.e. fever, cold or flu) please contact your surgeon as soon as possible. 9. It is important that you be on time. If a situation occurs where you may be late, or if you have any questions or problems, please call [phone].    10. Your surgery time may be subject to change. You will receive a phone call the day prior to surgery to confirm your arrival time. 11. Pediatric patients: please bring a change of clothes, diapers, bottle/sippy cup, pacifier, etc.      Special Instructions:     Take all medications and inhalers, as prescribed, on the morning of surgery with a sip of water EXCEPT: adderall a.m. of day of surgery       Insulin Dependent Diabetic patients: Take your diabetic medications as prescribed the day before surgery. Hold all diabetic medications the day of surgery. If you are scheduled to arrive for surgery after 8:00 AM, and your AM blood sugar is >200, please call Ambulatory Surgery. I understand a pre-operative phone call will be made to verify my surgery time. In the event that I am not available, I give permission for a message to be left on my answering service and/or with another person?       Yes    Reviewed instructions via telephone, patient verbalized understanding          ___________________      ___________________      ________________  (Signature of Patient)          (Witness)                   (Date and Time)

## 2022-06-17 NOTE — PROGRESS NOTES
The patient called in for more pain medication. She had been prescribed oxycodone/acetaminophen by Dr. Sera Mccord for her axilla and arm pain. She is requesting more medication and is now able to take the medication along with Benadryl per Dr. Sushant Carrasquillo suggestion due to side effects she had been experiencing. Dr. Bc Puga called in the patient's prescription to [facility]. She did not see Dr. Shyann Swartz yesterday due to her hormone receptors not being resulted. The office rescheduled her appointment for 6/29/22. She is scheduled for trino cath insertion 6/28/22. The patient was appreciative.

## 2022-06-20 NOTE — PROGRESS NOTES
[address] [facility] [address], [address] [facility], [address] [place] [address], [address] [place] [address]  [phone]       Oncology Note        Patient: Dusty Calero MRN: [id number]  SSN: [id number]    YOB: 1980  Age: 39 y.o. Sex: female      Subjective:      Dusty Calero is a 39 y.o. female who I am seeing for a new diagnosis of right sided breast carcinoma. She noted a lump in her right breast. She saw Dr. Lorri Jeter at [facility]. She delayed getting a mammogram. Shortly thereafter she started noticing swelling in her right arm and tightness. Then she started experiencing pain in the right groin since April 2022. The pain has eased up lately. Taste is affected and her appetite is low. She saw Dr. Mortimer Eagles in follow up and was referred to Dr. Kehinde Quach. A breast biopsy shows presence of nuclear gr 2 IDC, ER 75% MT 30% ki 67 70% Her 2 3+ by IHC. She underwent a staging CT shows widespread bony metastasis and liver metastasis in addition to the right breast mass. Review of Systems:    Constitutional: negative  Eyes: negative  Ears, Nose, Mouth, Throat, and Face: negative  Respiratory: negative  Cardiovascular: negative  Gastrointestinal: negative  Genitourinary:negative  Integument/Breast: negative  Hematologic/Lymphatic: negative  Musculoskeletal:negative  Neurological: negative    Past Medical History:   Diagnosis Date    ADHD     Anxiety     Appetite loss     Bipolar depression ([address]     Burning with urination     Cancer ([address]     met right breast    Colovesical fistula 4/12/2014    Diverticulitis     Fatigue     Fever chills     Headache(784.0)      Past Surgical History:   Procedure Laterality Date    HX COLONOSCOPY      HX HEENT      wisdom teeth    HX PARTIAL COLECTOMY      took out 1.5 ft of intestines       History reviewed. No pertinent family history.   Social History     Tobacco Use    Smoking status: Current Every Day Smoker Packs/day: 0.25     Years: 10.00     Pack years: 2.50     Types: Cigarettes    Smokeless tobacco: Never Used   Substance Use Topics    Alcohol use: No      Prior to Admission medications    Medication Sig Start Date End Date Taking? Authorizing Provider   oxyCODONE-acetaminophen (PERCOCET) 5-325 mg per tablet Take 1 Tablet by mouth every four (4) hours as needed for Pain for up to 3 days. Max Daily Amount: 6 Tablets. 5/58/42 6/25/22 Yes Fidel Luevano MD   dextroamphetamine-amphetamine (AdderalL) 30 mg tablet Take 30 mg by mouth two (2) times a day. Yes Provider, Historical   LEVONORGESTREL-ETH ESTRA (SAPPHIRE PO) Take 1 Tab by mouth every morning. Yes Provider, Historical   citalopram (CELEXA) 10 mg tablet Take 20 mg by mouth daily. Indications: DEPRESSION ASSOCIATED WITH MANIC DEPRESSIVE DISORDER   Yes Provider, Historical   oxyCODONE-acetaminophen (PERCOCET) 5-325 mg per tablet Take 1 Tab by mouth every four (4) hours as needed. 4/14/14   Fede Mazariegos MD   ciprofloxacin (CIPRO) 500 mg tablet Take 500 mg by mouth two (2) times a day. Indications: INFECTIOUS DISEASE OF ABDOMEN    Provider, Historical              Allergies   Allergen Reactions    Amoxicillin Hives           Objective:     Vitals:    06/20/22 1546   BP: (!) 163/88   Pulse: 94   Temp: 98 °F (36.7 °C)   SpO2: 95%   Weight: 314 lb (142.4 kg)   Height: 5' 1.5\" (1.562 m)            Physical Exam:  GENERAL: alert, cooperative, no distress, obese  EYE: conjunctivae/corneas clear. PERRL, EOM's intact  LYMPHATIC: Cervical, supraclavicular, and axillary nodes normal.   THROAT & NECK: normal and no erythema or exudates noted. LUNG: clear to auscultation bilaterally  HEART: regular rate and rhythm, S1, S2 normal, no murmur, click, rub or gallop  ABDOMEN: soft, non-tender. Bowel sounds normal. No masses,  no organomegaly  EXTREMITIES: RUE edema  SKIN: Normal.  NEUROLOGIC: AOx3.  Gait normal. Reflexes and motor strength normal and symmetric. Cranial nerves 2-12 and sensation grossly intact. CT Results (most recent):  Results from Hospital Encounter encounter on 06/20/22    CT ABD PELV W CONT    Narrative  EXAM: CT CHEST W CONT, CT ABD PELV W CONT    INDICATION: Right breast cancer, staging    COMPARISON: CT abdomen pelvis from 3/19/2012    IV CONTRAST: 100 mL of Isovue-370. ORAL CONTRAST: Oral contrast was administered to better evaluate the bowel. TECHNIQUE:  Following the uneventful intravenous administration of contrast, thin axial  images were obtained through the chest, abdomen and pelvis. Coronal and sagittal  reformats were generated. CT dose reduction was achieved through use of a  standardized protocol tailored for this examination and automatic exposure  control for dose modulation. FINDINGS:    CHEST WALL: Right breast skin thickening. There is a 2.7 x 3.2 cm mass in the  lower outer breast, 301:29. Multiple ovoid right breast nodules are identified  including 2.4 cm (301:7), and partially imaged 2.6 cm (301:13) nodules with  biopsy clips. No definitive right axillary nodes. Enlarged 2.1 x 1.9 cm and 2.4  x 2.4 cm left axillary nodes. There is also a 10 mm pericardiophrenic node,  301:42. THYROID: No nodule. MEDIASTINUM: No mass or lymphadenopathy. SOY: No mass or lymphadenopathy. THORACIC AORTA: No dissection or aneurysm. MAIN PULMONARY ARTERY: Normal in caliber. TRACHEA/BRONCHI: Patent. ESOPHAGUS: No wall thickening or dilatation. HEART: Normal in size. PLEURA: No effusion or pneumothorax. LUNGS: No nodule, mass, or airspace disease. LIVER: Diffuse hepatic hypodensities are noted with representative examples:  Confluent 8.4 x 5.5 cm segment 4 mass, 301:65; 5.2 x 6.7 cm segment 6 mass,  301:75; and 4.7 x 5.5 cm segment 8 mass, 301:46. There is narrowing of the right  portal vein, 301:61, by extrinsic compression. BILIARY TREE: Gallbladder is within normal limits. CBD is not dilated.   SPLEEN: Unremarkable. PANCREAS: No mass or ductal dilatation. ADRENALS: Unremarkable. KIDNEYS: No mass, calculus, or hydronephrosis. STOMACH: Unremarkable. SMALL BOWEL: No dilatation or wall thickening. 1.8 cm jejunal lipoma, 301:99. Submucosal fat deposition in the terminal ileum suggestive of chronic  inflammation. COLON: No dilatation or wall thickening. Submucosal fat deposition in the rectum  suggestive of chronic inflammation. APPENDIX: Unremarkable. PERITONEUM: No ascites or pneumoperitoneum. RETROPERITONEUM: No aortic aneurysm. Multiple prominent retroperitoneal and  periaortic nodes measuring up to 11 mm (301:83). REPRODUCTIVE ORGANS: The uterus is unremarkable. No adnexal masses. URINARY BLADDER: No mass or calculus. BONES: Erosive changes involving the right pubic symphysis and superior pubic  ramus with suspected fracture plane, 301:124 and 125. 1.4 cm osteolytic lesion  of left ilium, 301:95. 2.5 cm L4 osteolytic lesion, 1.1 cm T10 osteolytic  lesion, and 1.3 cm T6 osteolytic lesion. Additional punctate lytic lesions of  the pelvis are noted. Expansile and erosive left third rib lesion, 602:112. ABDOMINAL WALL: No mass or hernia. ADDITIONAL COMMENTS: N/A    Impression  1. Right breast mass and multiple right breast nodules consistent with primary  malignancy. 2.  Left axillary and pericardiophrenic lymphadenopathy. 3.  Diffuse hepatic metastatic disease with extrinsic compression of the right  portal vein. 4.  Periaortic lymphadenopathy most consistent with metastatic disease. 5.  Diffuse osseous lesions consistent with metastatic disease. 6.  Suspected pathologic right superior pubic ramus nondisplaced fracture. 7.  Additional findings as above.         Nuclear Medicine Results (most recent):  Results from Hospital Encounter encounter on 06/20/22    NM BONE SCAN [facility] BODY    Narrative  EXAM: Whole Body Nuclear Bone Scan is performed with IV injection of 21.6 mCi  technetium 99m.    INDICATION: Breast cancer, stage III, right. Comparison Bone Scan: None. Correlation Imaging Studies: CT Chest Abdomen Pelvis 6/20/2022. TECHNIQUE: Anterior and posterior whole body scintigraphic planar images are  obtained. FINDINGS:  There are foci of abnormal increased radiotracer uptake compatible with  metastases. These are present in the calvarium, bilateral ribs, spine and  pelvis. They correspond with permeative, and sclerotic, lesions on CT. However,  the predominantly lytic CT lesions, largest located in L4, are not  scintigraphically apparent probably due to a more aggressive nature of these  lytic lesions which are highly likely metastatic as well. Impression  Multiple bone metastases. Assessment:     1. Right sided invasive breast carcinoma with metastasis to the liver and bones  T3 N2 M1 (Stage IV) infiltrating ductal carcinoma, axillary lymphadenopathy, grade 2, ki 67 70%, ER 75%, NV 30%, Her 2 3+ by IHC    ECOG PS 0  Intent of Treatment - palliative   Prognosis guarded    > Patient understands that disease is incurable but treatable,   > Goal of therapy is going to be extension of life with preservation of quality. > The standard of care for the treatment of metastatic Her 2+ breast cancer is a combination of pertuzumab, trastuzumab and taxotere. Pertuzumab and trastuzumab bind to different HER2 epitopes and have complementary mechanisms of action. Thus the two agents, when given together, provide a more comprehensive blockade of HER2 signaling and result in greater antitumor activity than either agent alone in HER2-positive breast cancer. LESLIE study randomized 808 patients with HER2-positive metastatic breast cancer to receive placebo plus trastuzumab plus docetaxel (control group) or pertuzumab plus trastuzumab plus docetaxel (pertuzumab group) as first-line treatment until the time of disease progression.  The median progression-free survival was 12.4 months in the control group, as compared with 18.5 months in the pertuzumab group (hazard ratio for progression or death, 0.62; 95% confidence interval, 0.51 to 0.75; P<0.001). The interim analysis of overall survival showed a strong trend in favor of pertuzumab plus trastuzumab plus docetaxel. The safety profile was generally similar in the two groups, with no increase in left ventricular systolic dysfunction.            > I counseled the patient regarding the chemotherapy. Discussion included side-effect, toxicity, benefit and risks of chemotherapy. I gave her handouts for education regarding the chemotherapy drugs. She understood the expected side-effect which includes alopecia, nausea, peripheral neuropathy, neutropenic fever, anemia, need for transfusion among other things. The duration of this treatment plan will be until progression, intolerable side effects, or patient choice. Patient will be meeting with navigation services to discuss any financial barriers to care/estimated cost of care. The patient's emotional well being was addressed during this office visit and patient seems to be coping well with the diagnosis and the treatment. Common Side Effects of Chemotherapy  Decreased Blood Counts Your blood counts can decrease temporarily due to chemotherapy, they will recover over time. This is an expected side effect that your Doctor will be monitoring.  - If you experience fevers (temperature >100.4°F), bleeding or unexplained bruising, please call the office right away   Risk of Infection Your white blood cells can decrease temporarily due to chemotherapy and can put you at higher risk of infection.   Washing hands frequently with soap and avoiding sick contacts can reduce your risk of infection.  - If you experience fevers (temperature >100.4°F), shaking chills, or any signs of infection, please call the office immediately   Anemia Chemotherapy can cause your red blood cells to temporarily decrease; this is an expected side effect that your Doctor will be monitoring.  - You may experience fatigue if this occurs, please notify the office if you experience bleeding, shortness of breath with minimal exertion or at rest, rapid heartbeat, or feeling as though you may lose consciousness. Hair Loss Chemotherapy can affect your hair follicles and cause you to lose hair. This can occur on your scalp hair but also all over your body including eyebrows and eye lashes   Nausea  You have been prescribed nausea medication to take if needed. Please follow the directions given to you by your Doctor. - Please call the office if the medications you have been given are not relieving nausea. Vomiting Make sure you are taking anti-nausea medication as prescribed. Eating small amounts of bland foods frequently can help. - Please call the office right away if you are vomiting more than 4 times per day or are unable to keep down food or fluids   Diarrhea Eating small amounts of bland foods frequently can help, increase your fluid intake. It is usually ok to take Imodium for diarrhea. - Please call the office right away if you experience more than 4 episodes of watery diarrhea or if you are feeling dehydrated. You can reach Medical Oncology at [phone] [facility] with further questions or concerns at: [phone]  - Calls during normal business hours will reach our office.  - Calls after hours or on the weekend will reach an answering service and the on-call Oncologist will return your call. I discussed with the patient the risk of infertility with the systemic chemotherapy. There is < 20% risk of infertility with the systemic chemotherapy. I educated her about the technique and role of ovarian preservation and gave her the option to see a fertility specialist to discuss this topic. She has no desire to preserve her fertility. Plan:        1. 2-D Echo  2. Port-a-cath placement  3.  Refer to [facility] to start chemotherapy  4. CA 27.29, Vit D level, CBC, CMP  5. Plan to start THP      Signed By: Charbel Marley MD     June 20, 2022           CC. Valentina Alejo MD  CC. Karen Jones NP  CC.  Odalys Giang MD

## 2022-06-20 NOTE — LETTER
6/20/2022    Patient: Domingo Whitfield   YOB: 1980   Date of Visit: 6/20/2022     Vera Thompson NP  [address]  [address] [address]  Via Fax: [phone]    Dear Vera Thompson NP,      Thank you for referring Ms. Gillian Fabian to [address] [place] [address] for evaluation. My notes for this consultation are attached. If you have questions, please do not hesitate to call me. I look forward to following your patient along with you.       Sincerely,    Cherry Hammans, MD

## 2022-06-20 NOTE — PROGRESS NOTES
Terri Mccrary is a 39 y.o. female here for new patient appt for met right breast cancer. Referred by Dr Carleen Jorge  Pt had port and biopsy performed on 6/16/22. Scans done today. Pt here with sister [facility]. She lives with her fiance. Pt has been having a lot of right arm pain since March. Her arm get really tight and is now. Her ring and pinky finger on right hand do not work. 1. Have you been to the ER, urgent care clinic since your last visit? Hospitalized since your last visit? New Pt    2. Have you seen or consulted any other health care providers outside of the [address] [place] [address] since your last visit? Include any pap smears or colon screening.   New Pt

## 2022-06-20 NOTE — PROGRESS NOTES
Oncology Social Work  Psychosocial Assessment    Reason for Assessment:      [] Social Work Referral [x] Initial Assessment [x] New Diagnosis [] Other    Advance Care Planning:  Advance Care Planning 6/16/2022   Confirm Advance Directive None       Sources of Information:    [x]Patient  [x]Family  [x]Staff  [x]Medical Record    Mental Status:    [x]Alert  []Lethargic  []Unresponsive   [] Unable to assess   Oriented to:  [x]Person  [x]Place  [x]Time  [x]Situation      Relationship Status:  []Single  []  [x]Significant Other/Life Partner  []  []  []    Living Circumstances:  []Lives Alone  [x]Family/Significant Other in Household  []Roommates  []Children in the Home  []Paid Caregivers  []Assisted Living Facility/Group [address]  []Homeless  []Incarcerated  []Environmental/Care Concerns  []Other:    Employment Status:  [x]Employed Full-time []Employed Part-time []Homemaker  [] Retired [] Short-Term Disability [] Long-Term Disability  [] Unemployed   [] [place]   [] SSDI  [] Self-Employment    Barriers to Learning:    []Language  []Developmental  []Cognitive  []Altered Mental Status  []Visual/Hearing Impairment  []Unable to Read/Write  []Motivational  [] Challenges Understanding Medical Jargon [x]No Barriers Identified      Financial/Legal Concerns:    []Uninsured  [x]Limited Income/Resources  []Non-Citizen  []Food Insecurity [x]No Concerns Identified   []Other:    Mandaeism/Spiritual/Existential:  Does patient have any spiritual or Denominational beliefs? [x] Yes [] No  Is the patient involved in a spiritual, enrique or Denominational community?  [] Yes [x] No  Patient expressing spiritual/existential angst? [] Yes [] No  Notes:    Support System:    Identified Support Person/Group:  []Strong  [x]Fair  []Limited    Coping with Illness:   [x]  Coping Well  [] Challenges Coping with Serious Illness [] Situational Depression [x] Situational Anxiety [] Anticipatory Grief  [] Recent Loss [] Caregiver [place]            Narrative:    Met with patient and her sister, Tyree Kuhn, to introduce social work navigator role and supports. 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 PT has life partner, Tamy Vanessa, who has taken off work to be with her and support her. [facility]. Pt has stage IV breast cancer, triple positive, with liver and bone metastasis. PT started noticing changes in her breast in late March. Pt will be treated with Taxotere and Herceptin and Projeta. 1 day every 21 days. Side effects hair loss, diarrhea and loss of appetite, watch for neuropathy. PT will work on 3 things she can control - staying active, nutrition/eating healthy, and positive attitude. Plan:   1. Introduce self and role of the  in the [employer]. 2. Informed the patient of the [facility] and available resources there. 3. Continue to meet with the patient when she returns to the clinic for ongoing assessment of the patient's adjustment to her diagnosis and treatment. 4. Ongoing psychosocial support as desired by patient. Referral/Handouts:      Complementary therapies referral  Insurance/Entitlements referral  Financial/Medication assistance referral       Mathieu Estimable.  ANTOINETTE [place]ton/RICHAR  Supervisee in Social Work

## 2022-06-21 NOTE — PROGRESS NOTES
Met with pt and pt sister briefly after MD consultation. Pt will call us when she is ready to come back in for chemo education. Myself and or Madina the pharmacist will give her chemotherapy education.

## 2022-06-23 NOTE — BRIEF OP NOTE
Brief Postoperative Note    Patient: Benita Tran  YOB: 1980  MRN: [id number]    Date of Procedure: 6/23/2022     Pre-Op Diagnosis: RIGHT BREAST CANCER    Post-Op Diagnosis: Same as preoperative diagnosis. Procedure(s):  PORT A CATH INSERTION  . Surgeon(s): Amina Jain MD    Surgical Assistant: Surg Asst-1: Marleny José    Anesthesia: General     Estimated Blood Loss (mL): Minimal    Complications: None    Specimens: * No specimens in log *     Implants:   Implant Name Type Inv.  Item Serial No.  Lot No. LRB No. Used Action   PORT INFUS 8FR TI ATTCH CHRONOFLEX CATH TI RADPQ OPN SUT H - SN/A  PORT INFUS 8FR TI ATTCH CHRONOFLEX CATH TI RADPQ OPN SUT H N/A [employer] [id number] Left 1 Implanted       Drains: * No LDAs found *    Findings: port in good position    Electronically Signed by Caro Portillo MD on 6/23/2022 at 10:01 AM  Dictated stat

## 2022-06-23 NOTE — PERIOP NOTES
1005-Pt. To pacu, sleepy but arousable. Vss. Incision to left chest intact. C/o tightness to right arm 8/10.    1014-Pcxr complete. 1020-Medicated w/fentanyl 25 mcg iv for pain 8/10 to right arm. Will monitor. 1033-Medicated w/fentanyl 25 mcg iv for pain 5/10 to right arm. Will monitor. 1037-Dr. Jacqueline Shook notified of xray results. 1045-Discharge instructions reviewed w/pt. And sister. They verbalized understanding. Pt. States pain to right arm is tolerable at 5/10.    1106-Pts. bp elevated. Dr. Henri Ferrera aware. Pt. .instructed to follow up w/pcp regarding bp. Pt. States ready for discharge. Vss. Incision to left chest intact. Pain to right arm is tolerable at 5/10. Pt discharged via wheelchair to car, accompanied by RN. Pt discharged awake and alert, respirations equal and unlabored, skin warm, dry, and intact. Pt and family members' questions and concerns addressed prior to discharge.

## 2022-06-23 NOTE — ANESTHESIA PREPROCEDURE EVALUATION
Anesthetic History   No history of anesthetic complications            Review of Systems / Medical History  Patient summary reviewed, nursing notes reviewed and pertinent labs reviewed    Pulmonary          Smoker ( down to 3cigs/day from 1/4 ppd)         Neuro/Psych             Comments: ADHD  Bipolar depression  anxiety Cardiovascular  Within defined limits                Exercise tolerance: >4 METS     GI/Hepatic/Renal  Within defined limits              Endo/Other        Morbid obesity and cancer     Other Findings   Comments: Right breast cancer with mets to liver & bone         Physical Exam    Airway  Mallampati: I  TM Distance: > 6 cm  Neck ROM: normal range of motion   Mouth opening: Normal    Comments: Tongue is red with white coating Cardiovascular    Rhythm: regular  Rate: normal         Dental    Dentition: Caps/crowns  Comments: Right upper & lower molars x2   Pulmonary  Breath sounds clear to auscultation               Abdominal  GI exam deferred       Other Findings            Anesthetic Plan    ASA: 3  Anesthesia type: general          Induction: Intravenous  Anesthetic plan and risks discussed with: Patient

## 2022-06-23 NOTE — PERIOP NOTES
Air Warming blanket placed on pt; turned on for comfort    Permission received to review discharge instructions and discuss private health information with sister  and will have someone with them after discharge

## 2022-06-23 NOTE — ANESTHESIA POSTPROCEDURE EVALUATION
Procedure(s):  PORT A CATH INSERTION  . .    general    Anesthesia Post Evaluation      Multimodal analgesia: multimodal analgesia used between 6 hours prior to anesthesia start to PACU discharge  Patient location during evaluation: PACU  Patient participation: complete - patient participated  Level of consciousness: awake and alert  Pain management: satisfactory to patient  Airway patency: patent  Anesthetic complications: no  Cardiovascular status: acceptable  Respiratory status: acceptable  Hydration status: acceptable  Comments: Pt having right arm pain due to axillary mass which was present PTA. Denies surgical pain.  CXR OK  Post anesthesia nausea and vomiting:  none  Final Post Anesthesia Temperature Assessment:  Normothermia (36.0-37.5 degrees C)      INITIAL Post-op Vital signs:   Vitals Value Taken Time   /75 06/23/22 1030   Temp 37 °C (98.6 °F) 06/23/22 1006   Pulse 97 06/23/22 1030   Resp 22 06/23/22 1030   SpO2 98 % 06/23/22 1030

## 2022-06-23 NOTE — OP NOTES
[address]  OPERATIVE REPORT    Name:  Adore Burgos  MR#:  [id number]  :  1980  ACCOUNT #:  [de-identified]  DATE OF SERVICE:  2022      PREOPERATIVE DIAGNOSIS:  Right breast cancer stage 4, need IV access for chemotherapy. POSTOPERATIVE DIAGNOSIS:  Right breast cancer stage 4, need IV access for chemotherapy. PROCEDURE PERFORMED:  Left ultrasound-guided IJ port insertion. SURGEON:  Wanda Murrieta MD    ASSISTANT:  Rob Hernández. ANESTHESIA:  General.    COMPLICATIONS:  None. SPECIMENS REMOVED:  None. IMPLANTS:  An 8-Tamazight PowerPort. ESTIMATED BLOOD LOSS:  Minimal.    DRAINS:  None. FINDINGS:  Port in good position. INDICATIONS FOR PROCEDURE:  A 27-year-old female with stage IV right breast cancer, needed a port for chemotherapy. PROCEDURE IN DETAIL:  The patient was seen in the preop holding area, where surgical site was marked by surgeon. Informed consent was obtained. She was taken to the operating room, laid in supine position, where general endotracheal anesthesia was induced. Bilateral chest and neck were prepped and draped in the usual fashion. A time-out was performed. Attention was turned to left chest wall and subclavian access was made with an 18-gauge introducer needle, after the patient was placed in Trendelenburg position, I was unable to obtain this so this was converted to a left IJ ultrasound-guided port placement. The ultrasound was used to identify the left internal jugular vein. An 18-gauge introducer needle was used to access the same. The syringe was removed. The wire was threaded through the needle. The needle was removed and the wire was going towards the SVC and right atrium on fluoroscopy. Next, an incision was made on the left chest wall with a 15-blade, Bovie cautery was used to create a port pocket. The tunneling device was used to tunnel up to the wire and a stab incision was made with an 11-blade.   The dilator sheath was threaded over the wire. The inner cannula and wire were removed and the catheter was threaded through the sheath and this was torn away and then tunneling device was used the tunnel the catheter down to the port pocket site. The catheter was adjusted via fluoroscopy with the tip at the junction of SVC and right atrium. The catheter was cut, and threaded onto the port. The port was secured on either side with 3-0 Prolene and the port pocket was aspirated and flushed well with injectable saline. Next, 40 mL of local anesthetic was injected to the neck tissue and port pocket site and skin. The incisions were closed with interrupted 3-0 Vicryl and 4-0 subcuticular Monocryl. Skin glue was placed on the incision. All sponge, needle, and instrument counts were correct. The patient went to recovery in stable condition.         MD NIHARIKA Oneill/[id number]  D:  06/23/2022 10:05  T:  06/23/2022 10:50  JOB #:  [id number]

## 2022-06-23 NOTE — DISCHARGE INSTRUCTIONS
Discharge Instructions from Dr. Anand Height    · I will call you with the pathology results, typically within 1 week from today. · You may shower, but no hot tubs, swimming pools, or baths until your incision is healed. · No heavy lifting with the affected extremity (nothing greater than 5 pounds), and limit its use for the next 4-5 days. · You may use an ice pack for comfort for the next couple of days, but do not place ice directly on the skin. Rather, use a towel or clothing to serve as a barrier between skin and ice to prevent injury. · If I placed a drain, follow the drain instructions provided, especially as you keep a record of the drain output. · Follow medication instructions carefully. · Wear surgical bra for 24 hours, then remove. Wear supportive bra at all times. · You will have bruising and swelling  · Watch for signs of infection as listed below. · Redness  · Swelling  · Drainage from the incision or from your nipple that appears infected  · Fever over 101.5 degrees for consecutive readings, or over 99.5 if you are currently undergoing chemotherapy. · Call our office (number is below) for a follow-up appointment. · If you have any problems, our phone number is [phone] PAIN MEDICATIONS WITH FOOD     Narcotics tend to be constipating, we suggest taking a stool softener such as Colace or Miralax (follow package instructions). DO NOT DRIVE WHILE TAKING NARCOTIC PAIN MEDICATIONS. DO NOT TAKE SLEEPING MEDICATIONS OR ANTIANXIETY MEDICATIONS WHILE TAKING NARCOTIC PAIN MEDICATIONS,  ESPECIALLY THE NIGHT OF ANESTHESIA! CPAP PATIENTS BE SURE TO WEAR MACHINE WHENEVER NAPPING OR SLEEPING!     DISCHARGE SUMMARY from Nurse    The following personal items collected during your admission are returned to you:   Dental Appliance: Dental Appliances: None  Vision: Visual Aid: Contacts,At home  Hearing Aid:    Jewelry: Jewelry: None  Clothing: Clothing: With patient  Other Valuables: Other Valuables: None  Valuables sent to safe:        PATIENT INSTRUCTIONS:    After General Anesthesia or Intravenous Sedation, for 24 hours or while taking prescription Narcotics:        Someone should be with you for the next 24 hours. For your own safety, a responsible adult must drive you home. · Limit your activities  · Recommended activity: Rest today, up with assistance today. Do not climb stairs or shower unattended for the next 24 hours. · Please start with a soft bland diet and advance as tolerated (no nausea) to regular diet. · If you have a sore throat you should try the following: fluids, warm salt water gargles, or throat lozenges. If it does not improve after several days please follow up with your primary physician. · Do not drive and operate hazardous machinery  · Do not make important personal or business decisions  · Do  not drink alcoholic beverages  · If you have not urinated within 8 hours after discharge, please contact your surgeon on call. Report the following to your surgeon:  · Excessive pain, swelling, redness or odor of or around the surgical area  · Temperature over 100.5  · Nausea and vomiting lasting longer than 4 hours or if unable to take medications  · Any signs of decreased circulation or nerve impairment to extremity: change in color, persistent  numbness, tingling, coldness or increase pain      · You will receive a Post Operative Call from one of the Recovery Room Nurses on the day after your surgery to check on you. It is very important for us to know how you are recovering after your surgery. If you have an issue or need to speak with someone, please call your surgeon, do not wait for the post operative call. · You may receive an e-mail or letter in the mail from [employer] regarding your experience with us in the Ambulatory Surgery Unit. Your feedback is valuable to us and we appreciate your participation in the survey.        · If the above instructions are not adequate or you are having problems after your surgery, call the physician at their office number. · We wish you a speedy recovery ? What to do at Home:      *  Please give a list of your current medications to your Primary Care Provider. *  Please update this list whenever your medications are discontinued, doses are      changed, or new medications (including over-the-counter products) are added. *  Please carry medication information at all times in case of emergency situations. If you have not received your influenza and/or pneumococcal vaccine, please follow up with your primary care physician. The discharge information has been reviewed with the patient and caregiver. The patient and caregiver verbalized understanding. TO PREVENT AN INFECTION      1. [address] YOUR HANDS     To prevent infection, good handwashing is the most important thing you or your caregiver can do.  Wash your hands with soap and water or use the hand  we gave you before you touch any wounds. 2. SHOWER     Use the antibacterial soap we gave you when you take a shower.  Shower with this soap until your wounds are healed.  To reach all areas of your body, you may need someone to help you.  Dont forget to clean your belly button with every shower. 3.  USE CLEAN SHEETS     Use freshly cleaned sheets on your bed after surgery.  To keep the surgery site clean, do not allow pets to sleep with you while your wound is still healing. 4. STOP SMOKING     Stop smoking, or at least cut back on smoking     Smoking slows your healing.

## 2022-06-23 NOTE — PERIOP NOTES
Karly Betancourt  1980  [id number]    Situation:  Verbal report given from: Sudarshan Salas CRNA, Candido Morales RN  Procedure: Procedure(s):  PORT A CATH INSERTION  .     Background:    Preoperative diagnosis: RIGHT BREAST CANCER    Postoperative diagnosis: RIGHT BREAST CANCER    :  Dr. Mehdi Goldsmith    Assistant(s): Circ-1: Jennifer Senior RN  Scrub RN-1: Janes Chopra RN  Surg Asst-1: Rica Trujillo    Specimens: * No specimens in log *    Assessment:  Intra-procedure medications         Anesthesia gave intra-procedure sedation and medications, see anesthesia flow sheet     Intravenous fluids: LR@ KVO     Vital signs stable       Recommendation:    Permission to share finding with sister : yes

## 2022-06-24 PROBLEM — C50.919 METASTATIC BREAST CANCER (HCC): Status: ACTIVE | Noted: 2022-01-01

## 2022-06-27 NOTE — PROGRESS NOTES
Swelling of right arm/ painful. VORB FROM DR Adele Green REFERRAL TO THE [facility].

## 2022-06-29 NOTE — TELEPHONE ENCOUNTER
[address] [facility] [address], [address] \[facility] [place] [facility]", [address] [facility], [address]  1980      Called patient regarding VM message. She reports that her elbow is painful. Edema is about the same. Hydrocodone is helpful with management of pain. Lymphedema referral was placed and follow up message sent regarding an appointment by the referral coordinator.     Signed By: Monroe Mosquera NP     June 29, 2022

## 2022-07-07 NOTE — PROGRESS NOTES
Tony Melo is a 43 y.o. female here for follow up for met right breast cancer. Treatment today:  Cycle 1 Day 1 Docetaxel   Pt received port 6/23/22. Pt is a little anxious but feeling well. Her right arm still has pain and swelling. She has appt with the lymphedema   Clinic next week. Her right hand is very hard to use. She has some stitches where her port was put in she is concerned about. Does not want to pull it out. Does not hurt just annoying. She is having trouble sleeping     1. Have you been to the ER, urgent care clinic since your last visit? Hospitalized since your last visit? no    2. Have you seen or consulted any other health care providers outside of the [address] [place] [address] since your last visit? Include any pap smears or colon screening.   no

## 2022-07-08 NOTE — PROGRESS NOTES
[address] [facility] Visit Note    255- Pt arrived to [facility] ambulatory in no acute distress for C1D1 THP + Zometa. Assessment unremarkable except decreased appetite, constipation, and right arm weakness/pain. L chest port accessed without issue and positive blood return noted. Patient to MD office for appt. Patient denies any recent or upcoming dental work. Labs obtained - CBC w/ diff, CMP, Acute Hepatitis Panel  Recent Results (from the past 12 hour(s))   CBC WITH AUTOMATED DIFF    Collection Time: 07/08/22  8:21 AM   Result Value Ref Range    WBC 12.6 (H) 3.6 - 11.0 K/uL    RBC 4.71 3.80 - 5.20 M/uL    HGB 12.9 11.5 - 16.0 g/dL    HCT 41.2 35.0 - 47.0 %    MCV 87.5 80.0 - 99.0 FL    MCH 27.4 26.0 - 34.0 PG    MCHC 31.3 30.0 - 36.5 g/dL    RDW 20.8 (H) 11.5 - 14.5 %    PLATELET 252 112 - 362 K/uL    MPV 11.5 8.9 - 12.9 FL    NRBC 0.0 0  WBC    ABSOLUTE NRBC 0.00 0.00 - 0.01 K/uL    NEUTROPHILS 75 32 - 75 %    LYMPHOCYTES 14 12 - 49 %    MONOCYTES 8 5 - 13 %    EOSINOPHILS 1 0 - 7 %    BASOPHILS 1 0 - 1 %    IMMATURE GRANULOCYTES 1 (H) 0.0 - 0.5 %    ABS. NEUTROPHILS 9.5 (H) 1.8 - 8.0 K/UL    ABS. LYMPHOCYTES 1.8 0.8 - 3.5 K/UL    ABS. MONOCYTES 1.0 0.0 - 1.0 K/UL    ABS. EOSINOPHILS 0.1 0.0 - 0.4 K/UL    ABS. BASOPHILS 0.1 0.0 - 0.1 K/UL    ABS. IMM.  GRANS. 0.1 (H) 0.00 - 0.04 K/UL    DF SMEAR SCANNED      RBC COMMENTS ANISOCYTOSIS  2+       METABOLIC PANEL, COMPREHENSIVE    Collection Time: 07/08/22  8:21 AM   Result Value Ref Range    Sodium 137 136 - 145 mmol/L    Potassium 3.5 3.5 - 5.1 mmol/L    Chloride 105 97 - 108 mmol/L    CO2 23 21 - 32 mmol/L    Anion gap 9 5 - 15 mmol/L    Glucose 95 65 - 100 mg/dL    BUN 21 (H) 6 - 20 MG/DL    Creatinine 0.74 0.55 - 1.02 MG/DL    BUN/Creatinine ratio 28 (H) 12 - 20      GFR est AA >60 >60 ml/min/1.73m2    GFR est non-AA >60 >60 ml/min/1.73m2    Calcium 10.6 (H) 8.5 - 10.1 MG/DL    Bilirubin, total 7.6 (H) 0.2 - 1.0 MG/DL    ALT (SGPT) 40 12 - 78 U/L    AST (SGOT) 227 (H) 15 - 37 U/L    Alk. phosphatase 850 (H) 45 - 117 U/L    Protein, total 6.9 6.4 - 8.2 g/dL    Albumin 2.6 (L) 3.5 - 5.0 g/dL    Globulin 4.3 (H) 2.0 - 4.0 g/dL    A-G Ratio 0.6 (L) 1.1 - 2.2     HEPATITIS PANEL, ACUTE    Collection Time: 07/08/22  8:21 AM   Result Value Ref Range    Hepatitis A, IgM NONREACTIVE NR      __          Hepatitis B surface Ag 0.16 Index    Hep B surface Ag Interp. Negative NEG      __          Hepatitis B core, IgM NONREACTIVE NR      __          Hep C virus Ab Interp. NONREACTIVE NR       Okay per Dr. Chava Argueta. Duke Posey NP to treat with labs outside treatment parameters.     Two nurses verified prior to administering:  Drug name  Drug dose  Infusion volume or drug volume when prepared in a syringe  Rate of administration  Route of administration  Expiration dates and/or times  Appearance and physical integrity of the drugs  Rate set on infusion pump, when used  Sequencing of drug administration     The following medications administered:  Medications Administered     0.9% sodium chloride infusion     Admin Date  07/08/2022 Action  New Bag Dose  25 mL/hr Rate  25 mL/hr Route  IntraVENous Administered By  Dorita Arnold RN          dexamethasone (DECADRON) 20 mg in 0.9% sodium chloride 50 mL IVPB     Admin Date  07/08/2022 Action  New Bag Dose  20 mg Route  IntraVENous Administered By  Dorita Arnold RN          DOCEtaxeL (TAXOTERE) 185 mg in 0.9% sodium chloride 250 mL, overfill volume 25 mL chemo infusion     Admin Date  07/08/2022 Action  New Bag Dose  185 mg Route  IntraVENous Administered By  Dorita Arnold RN          heparin (porcine) pf 500 Units     Admin Date  07/08/2022 Action  Given Dose  500 Units Route  InterCATHeter Administered By  Dorita Arnold RN          pertuzumab (PERJETA) 840 mg in 0.9% sodium chloride 250 mL, overfill volume 25 mL IVPB     Admin Date  07/08/2022 Action  New Bag Dose  840 mg Rate  303 mL/hr Route  IntraVENous Administered By  Guillermo Almaguer RN          sodium chloride (NS) flush 5-40 mL     Admin Date  07/08/2022 Action  Given Dose  10 mL Route  IntraVENous Administered By  Guillermo Almaguer RN           Admin Date  07/08/2022 Action  Given Dose  10 mL Route  IntraVENous Administered By  Guillermo Almaguer RN          trastuzumab (HERCEPTIN) 1,126 mg in 0.9% sodium chloride 250 mL, overfill volume 25 mL IVPB     Admin Date  07/08/2022 Action  New Bag Dose  1,126 mg Rate  219.1 mL/hr Route  IntraVENous Administered By  Guillermo Almaguer RN          zoledronic acid (ZOMETA) 4 mg/100mL infusion     Admin Date  07/08/2022 Action  New Bag Dose  4 mg Rate  400 mL/hr Route  IntraVENous Administered By  Guillermo Almaguer RN              Patient reported feeling hot about [facility] through [address]. Infusion stopped and vitals checked. Vitals stable, patient reported feeling passed and that she gets hot flashes intermittently. Patient agreeable to restarting infusion after 5 mins. Patient Vitals for the past 12 hrs:   Temp Pulse Resp BP SpO2   07/08/22 1631 -- (!) 102 18 (!) 158/88 --   07/08/22 1545 -- (!) 101 18 (!) 179/96 97 %   07/08/22 0839 -- (!) 103 -- (!) 175/97 --   07/08/22 0818 97.6 °F (36.4 °C) (!) 113 18 (!) 194/110 96 %     BP elevated. Patient reported being anxious and then having right arm pain. She denies headache/blurred vision. 1635- Pt tolerated treatment well, no adverse reactions noted. Port flushed per policy and de-accessed, 2x2 and tape placed. Chemo handout and education reviewed with patient. Pt discharged ambulatory in no acute distress, accompanied by sister. Next appointment 7/29/22.

## 2022-07-08 NOTE — PROGRESS NOTES
[address], [address] hospitals, 45 [facility], 200 [facility]  [phone]       Oncology Note      Patient: Nav Bullock MRN: [id number]  SSN: [id number]    YOB: 1980  Age: 43 y.o. Sex: female        Diagnosis:     1. Right sided invasive breast carcinoma with metastasis to the liver and bones  T3 N2 M1 (Stage IV) infiltrating ductal carcinoma, axillary lymphadenopathy, grade 2, ki 67 70%, ER 75%, NM 30%, Her 2 3+ by IHC    Treatment:     1. Palliative chemotherapy   Taxotere, Transtuzumab, Pertuzumab - Cycle 1 Day 1    Subjective:      Nav Bullock is a 43 y.o. female who I am seeing for a new diagnosis of right sided breast carcinoma. She noted a lump in her right breast. She saw Dr. Alondra Marks at [facility]. She delayed getting a mammogram. Shortly thereafter she started noticing swelling in her right arm and tightness. Then she started experiencing pain in the right groin since April 2022. The pain has eased up lately. Taste is affected and her appetite is low. She saw Dr. Blayne Ugalde in follow up and was referred to Dr. Jozef Pittman. A breast biopsy shows presence of nuclear gr 2 IDC, ER 75% NM 30% ki 67 70% Her 2 3+ by IHC. She underwent a staging CT shows widespread bony metastasis and liver metastasis in addition to the right breast mass. Patient is here today to start palliative chemotherapy. She complains of right arm swelling and pain and is scheduled to see the lymphedema clinic next week.       Review of Systems:    Constitutional: negative  Eyes: negative  Ears, Nose, Mouth, Throat, and Face: negative  Respiratory: negative  Cardiovascular: negative  Gastrointestinal: negative  Genitourinary:negative  Integument/Breast: right arm swelling  Hematologic/Lymphatic: negative  Musculoskeletal:negative  Neurological: negative    Past Medical History:   Diagnosis Date    ADHD     Anxiety     Appetite loss     Bipolar depression ([address]     Burning with urination     Cancer [facility])     met right breast    Colovesical fistula 4/12/2014    Diverticulitis     Fatigue     Fever chills     Headache(784.0)      Past Surgical History:   Procedure Laterality Date    HX BREAST BIOPSY Left 6/23/2022    . performed by Shankar King MD at [facility] AMBULATORY OR    HX COLONOSCOPY      HX HEENT      wisdom teeth    HX PARTIAL COLECTOMY      took out 1.5 ft of intestines       No family history on file. Social History     Tobacco Use    Smoking status: Current Every Day Smoker     Packs/day: 0.25     Years: 10.00     Pack years: 2.50     Types: Cigarettes    Smokeless tobacco: Never Used   Substance Use Topics    Alcohol use: No      Prior to Admission medications    Medication Sig Start Date End Date Taking? Authorizing Provider   LORazepam (ATIVAN) 1 mg tablet Take 0.5-1 mg by mouth every four (4) hours as needed for Anxiety. Yes Provider, Historical   HYDROcodone-acetaminophen (NORCO) 5-325 mg per tablet Take 1 Tablet by mouth every four (4) hours as needed for Pain for up to 7 days. Max Daily Amount: 6 Tablets. 7/5/22 7/12/22 Yes Johnathan Cody Officer, NP   lidocaine-prilocaine (EMLA) topical cream Apply a quarter size amount to port site 30min - 1 hour and cover with plastic wrap prior to scheduled chemotherapy to numb port site. 6/27/22  Yes Rolando Carrion MD   ondansetron (ZOFRAN ODT) 4 mg disintegrating tablet Take 1 Tablet by mouth every six (6) hours as needed for Nausea or Vomiting. 6/27/22  Yes Rolando Carrion MD   prochlorperazine (COMPAZINE) 10 mg tablet Take 0.5 Tablets by mouth every six (6) hours as needed for Nausea or Vomiting. 6/27/22  Yes Rolando Carrion MD   dextroamphetamine-amphetamine (AdderalL) 30 mg tablet Take 30 mg by mouth two (2) times a day. Yes Provider, Historical   LEVONORGESTREL-ETH ESTRA (SAPPHIRE PO) Take 1 Tab by mouth every morning.    Yes Provider, Historical   citalopram (CELEXA) 10 mg tablet Take 20 mg by mouth daily. Indications: DEPRESSION ASSOCIATED WITH MANIC DEPRESSIVE DISORDER   Yes Provider, Historical              Allergies   Allergen Reactions    Amoxicillin Hives           Objective:     Vitals:    07/08/22 0852   BP: (!) 159/81   Pulse: (!) 103   Resp: 18   Temp: 97.6 °F (36.4 °C)   SpO2: 96%   Weight: 307 lb 5.1 oz (139.4 kg)   Height: 5' 1.5\" (1.562 m)      Pain Scale: 8/10    Physical Exam:  GENERAL: alert, cooperative, no distress, obese  EYE: conjunctivae/corneas clear. LYMPHATIC: Cervical, supraclavicular, and axillary nodes normal.   THROAT & NECK: normal and no erythema or exudates noted. LUNG: clear to auscultation bilaterally  HEART: regular rate and rhythm  ABDOMEN: soft, non-tender. EXTREMITIES: RUE edema  SKIN: Normal.  NEUROLOGIC: AOx3. Gait normal.       Physical exam and ROS has been modified from a prior visit to make it relevant and current      CT Results (most recent):  Results from Hospital Encounter encounter on 06/20/22    CT ABD PELV W CONT    Narrative  EXAM: CT CHEST W CONT, CT ABD PELV W CONT    INDICATION: Right breast cancer, staging    COMPARISON: CT abdomen pelvis from 3/19/2012    IV CONTRAST: 100 mL of Isovue-370. ORAL CONTRAST: Oral contrast was administered to better evaluate the bowel. TECHNIQUE:  Following the uneventful intravenous administration of contrast, thin axial  images were obtained through the chest, abdomen and pelvis. Coronal and sagittal  reformats were generated. CT dose reduction was achieved through use of a  standardized protocol tailored for this examination and automatic exposure  control for dose modulation. FINDINGS:    CHEST WALL: Right breast skin thickening. There is a 2.7 x 3.2 cm mass in the  lower outer breast, 301:29. Multiple ovoid right breast nodules are identified  including 2.4 cm (301:7), and partially imaged 2.6 cm (301:13) nodules with  biopsy clips. No definitive right axillary nodes.  Enlarged 2.1 x 1.9 cm and 2.4  x 2.4 cm left axillary nodes. There is also a 10 mm pericardiophrenic node,  301:42. THYROID: No nodule. MEDIASTINUM: No mass or lymphadenopathy. SOY: No mass or lymphadenopathy. THORACIC AORTA: No dissection or aneurysm. MAIN PULMONARY ARTERY: Normal in caliber. TRACHEA/BRONCHI: Patent. ESOPHAGUS: No wall thickening or dilatation. HEART: Normal in size. PLEURA: No effusion or pneumothorax. LUNGS: No nodule, mass, or airspace disease. LIVER: Diffuse hepatic hypodensities are noted with representative examples:  Confluent 8.4 x 5.5 cm segment 4 mass, 301:65; 5.2 x 6.7 cm segment 6 mass,  301:75; and 4.7 x 5.5 cm segment 8 mass, 301:46. There is narrowing of the right  portal vein, 301:61, by extrinsic compression. BILIARY TREE: Gallbladder is within normal limits. CBD is not dilated. SPLEEN: Unremarkable. PANCREAS: No mass or ductal dilatation. ADRENALS: Unremarkable. KIDNEYS: No mass, calculus, or hydronephrosis. STOMACH: Unremarkable. SMALL BOWEL: No dilatation or wall thickening. 1.8 cm jejunal lipoma, 301:99. Submucosal fat deposition in the terminal ileum suggestive of chronic  inflammation. COLON: No dilatation or wall thickening. Submucosal fat deposition in the rectum  suggestive of chronic inflammation. APPENDIX: Unremarkable. PERITONEUM: No ascites or pneumoperitoneum. RETROPERITONEUM: No aortic aneurysm. Multiple prominent retroperitoneal and  periaortic nodes measuring up to 11 mm (301:83). REPRODUCTIVE ORGANS: The uterus is unremarkable. No adnexal masses. URINARY BLADDER: No mass or calculus. BONES: Erosive changes involving the right pubic symphysis and superior pubic  ramus with suspected fracture plane, 301:124 and 125. 1.4 cm osteolytic lesion  of left ilium, 301:95. 2.5 cm L4 osteolytic lesion, 1.1 cm T10 osteolytic  lesion, and 1.3 cm T6 osteolytic lesion. Additional punctate lytic lesions of  the pelvis are noted.  Expansile and erosive left third rib lesion, 602:112. ABDOMINAL WALL: No mass or hernia. ADDITIONAL COMMENTS: N/A    Impression  1. Right breast mass and multiple right breast nodules consistent with primary  malignancy. 2.  Left axillary and pericardiophrenic lymphadenopathy. 3.  Diffuse hepatic metastatic disease with extrinsic compression of the right  portal vein. 4.  Periaortic lymphadenopathy most consistent with metastatic disease. 5.  Diffuse osseous lesions consistent with metastatic disease. 6.  Suspected pathologic right superior pubic ramus nondisplaced fracture. 7.  Additional findings as above. Nuclear Medicine Results (most recent):  Results from Hospital Encounter encounter on 06/20/22    NM BONE SCAN [facility] BODY    Narrative  EXAM: Whole Body Nuclear Bone Scan is performed with IV injection of 21.6 mCi  technetium 99m. INDICATION: Breast cancer, stage III, right. Comparison Bone Scan: None. Correlation Imaging Studies: CT Chest Abdomen Pelvis 6/20/2022. TECHNIQUE: Anterior and posterior whole body scintigraphic planar images are  obtained. FINDINGS:  There are foci of abnormal increased radiotracer uptake compatible with  metastases. These are present in the calvarium, bilateral ribs, spine and  pelvis. They correspond with permeative, and sclerotic, lesions on CT. However,  the predominantly lytic CT lesions, largest located in L4, are not  scintigraphically apparent probably due to a more aggressive nature of these  lytic lesions which are highly likely metastatic as well. Impression  Multiple bone metastases.       Lab Results   Component Value Date/Time    WBC 12.6 (H) 07/08/2022 08:21 AM    HGB 12.9 07/08/2022 08:21 AM    HCT 41.2 07/08/2022 08:21 AM    PLATELET 377 40/05/7917 08:21 AM    MCV 87.5 07/08/2022 08:21 AM         Lab Results   Component Value Date/Time    Sodium 137 07/08/2022 08:21 AM    Potassium 3.5 07/08/2022 08:21 AM    Chloride 105 07/08/2022 08:21 AM    CO2 23 07/08/2022 08:21 AM Anion gap 9 07/08/2022 08:21 AM    Glucose 95 07/08/2022 08:21 AM    BUN 21 (H) 07/08/2022 08:21 AM    Creatinine 0.74 07/08/2022 08:21 AM    BUN/Creatinine ratio 28 (H) 07/08/2022 08:21 AM    GFR est AA >60 07/08/2022 08:21 AM    GFR est non-AA >60 07/08/2022 08:21 AM    Calcium 10.6 (H) 07/08/2022 08:21 AM    Bilirubin, total 7.6 (H) 07/08/2022 08:21 AM    Alk. phosphatase 850 (H) 07/08/2022 08:21 AM    Protein, total 6.9 07/08/2022 08:21 AM    Albumin 2.6 (L) 07/08/2022 08:21 AM    Globulin 4.3 (H) 07/08/2022 08:21 AM    A-G Ratio 0.6 (L) 07/08/2022 08:21 AM    ALT (SGPT) 40 07/08/2022 08:21 AM    AST (SGOT) 227 (H) 07/08/2022 08:21 AM         Assessment:     1. Right sided invasive breast carcinoma with metastasis to the liver and bones  T3 N2 M1 (Stage IV) infiltrating ductal carcinoma, axillary lymphadenopathy, grade 2, ki 67 70%, ER 75%, NH 30%, Her 2 3+ by IHC    ECOG PS 0  Intent of Treatment - palliative   Prognosis guarded    > Patient understands that disease is incurable but treatable,   > Goal of therapy is going to be extension of life with preservation of quality. > The standard of care for the treatment of metastatic Her 2+ breast cancer is a combination of pertuzumab, trastuzumab and taxotere. Palliative chemotherapy   Taxotere, Transtuzumab, Pertuzumab - Cycle 1 Day 1    Reviewed the expected side effects of treatment and ways to manage them. A detailed system by system evaluation of side effect was performed to assess chemotherapy related toxicity. Blood counts are acceptable. Results reviewed with the patient. On monthly zoledronic acid    The disease has a high risk of progression and the treatment also carries a substantial risk of side effects. All of this was assessed during this visit.      Hepatic dysfunction from high burden of cancer  Hopefully if the cancer improves with systemic treatment, LFT would improve      Plan:        > Start chemotherapy with Taxotere, Trastuzumab and Pertuzumab  > Monthly zometa  > Following with lymphedema clinic - appointment on 7/14/22  > Referral to palliative medicine  > Follow-up in 3 weeks      Signed By: Shavon Anderson NP     July 8, 2022         I personally saw and evaluated the patient and performed the key components of medical decision making with Jame Clark NP. I reviewed and verified the above documentation and modified it as needed. Ms. Vu Meléndez is a women with advanced Her 2 +ve breast carcinoma. She is starting systemic therapy. I obtained history, performed physical exam, reviewed labs and imaging and communicated the treatment plan to the patient. Signed by: Buddy Lantigua MD                     July 8, 2022        CC. Isamar Gallagher MD  CC. Domenic Barksdale NP  CC.  Toma Hammer MD

## 2022-07-08 NOTE — LETTER
7/8/2022    Patient: Shira Stewart   YOB: 1980   Date of Visit: 7/8/2022     Tanner Olivares NP  [address]  [address] [address]  Via Fax: [phone]    Dear Tanner Olivares NP,      Thank you for referring Ms. Joshua Goodwin to [address] [place] [address] for evaluation. My notes for this consultation are attached. If you have questions, please do not hesitate to call me. I look forward to following your patient along with you.       Sincerely,    Radha Burrell MD

## 2022-07-12 NOTE — TELEPHONE ENCOUNTER
Returned call and talked with MsYin Gail, discussed COVID negative status. Patient is going to come to [facility] this afternoon for IVF infusion. Did state that she felt so weak she could \"barely walk\". Advised if that was the case then she should report to the ED. She stated she felt strong enough to come [facility]. [facility].

## 2022-07-12 NOTE — PROGRESS NOTES
[facility] Visit Note    Add on appointment    1440 Pt arrived at [facility] ambulatory for IV hydration. Assessment completed. Pt reporting dizziness, HA and nausea. Port accessed per protocol with positive blood return. IV hydration started. Mimi Choudhary NP in to see pt. Patient denied having any symptoms of COVID-19, i.e. SOB, coughing, fever, or generally not feeling well. Also denies having been exposed to COVID-19 recently or having had any recent contact with family/friend that has a pending COVID test.     Patient Vitals for the past 24 hrs:   Temp Pulse Resp BP SpO2   07/12/22 1600 -- 93 18 (!) 165/86 --   07/12/22 1440 98.9 °F (37.2 °C) 98 -- (!) 148/81 98 %     Medications received:  Medications Administered     0.9% sodium chloride injection 5-40 mL     Admin Date  07/12/2022 Action  Given Dose  10 mL Route  IntraVENous Administered By  Chay Luu RN          heparin (porcine) pf 500 Units     Admin Date  07/12/2022 Action  Given Dose  500 Units Route  InterCATHeter Administered By  Loni Walton RN          ondansetron [facility]) injection 4 mg     Admin Date  07/12/2022 Action  Given Dose  4 mg Route  IntraVENous Administered By  Loni Walton RN          sodium chloride (NS) flush 5-40 mL     Admin Date  07/12/2022 Action  Given Dose  10 mL Route  IntraVENous Administered By  Loni Walton RN          sodium chloride 0.9 % bolus infusion 1,000 mL     Admin Date  07/12/2022 Action  New Bag Dose  1,000 mL Rate  1,000 mL/hr Route  IntraVENous Administered By  Chay Luu RN                4110 Tolerated treatment well, no adverse reaction noted. Pt reports dizziness slightly beter but still has HA. Port flushed and de-accessed. D/Cd from [facility] ambulatory and in no distress accompanied by family.   Next appt 7/28

## 2022-07-12 NOTE — TELEPHONE ENCOUNTER
Spoke with sister Oriana Curiel. She reports patient has had unrelieved headache, sore throat, very poor PO intake, mild chills, body aches but no fever. Will test for COVID and call office back with results.

## 2022-07-12 NOTE — TELEPHONE ENCOUNTER
Sister Nela Crew called for patient. For the last two days patient has had severe back pain- stomach and head are hurting, feels like something is stuck in her throat and has had some bpdy aches and chills. Anita Crane

## 2022-07-13 NOTE — TELEPHONE ENCOUNTER
Palliative Medicine  Nursing Note  [phone]  Fax [phone]      Telephone Call  Patient Name: Andria Zaidi  YOB: [birth year]         Advance Care Planning 7/8/2022   Patient's Healthcare Decision Maker is: Legal Next of Kin   Confirm Advance Directive None       Received outpatient Palliative Medicine referral from Dr. Lisa Victoria to see patient for symptom management and supportive care. Chart  reviewed. Nataly Kennedysimon, 44 yo, right breast cancer, mets to liver and bone     Pt's most recent office visit with Dr. Lisa Victoria was 7/8/22*. See Office Visit note for complete HPI, treatment history, and plan. ACP: Not on file                                                                                                                                  Nurse called pt to introduced Palliative Medicine services.   Left voicemail for patient to return call for scheduling    Selena Jones RN  Palliative Medicine

## 2022-07-13 NOTE — TELEPHONE ENCOUNTER
Attempted to follow up with Ms. Reynolds to discuss how she was feeling after fluids yesterday in [facility]. No answer and left VM to call office with concerns. Spoke with sister Aleisha Silverio who reported that Ms. Maurisio Bass was feeling better last night aroung 7pm, she also has not heard from her sister this morning and reported she is likely catching up on some rest. She will call us as well with further concerns.

## 2022-07-14 PROBLEM — A41.9 SEVERE SEPSIS (HCC): Status: ACTIVE | Noted: 2022-01-01

## 2022-07-14 PROBLEM — R65.20 SEVERE SEPSIS (HCC): Status: ACTIVE | Noted: 2022-01-01

## 2022-07-14 NOTE — ED PROVIDER NOTES
EMERGENCY DEPARTMENT HISTORY AND PHYSICAL EXAM      Date: 7/14/2022  Patient Name: Leon Pérez    History of Presenting Illness     Chief Complaint   Patient presents with    Diarrhea    Jaundice       History Provided By: Patient    HPI: Leon Pérez, 43 y.o. female with a past medical history significant for breast cancer with metastasis to bones and liver presents to the ED with cc of diarrhea, shortness of breath, abdominal distention. Patient states she has noticed worsening abdominal distention and pain over the past 3 to 4 days. During this time she has noted yellowing of her eyes and skin. She has also felt more groggy during this time. Patient states she developed diarrhea yesterday. States since that time she has had profuse watery diarrhea including incontinence of bowel. Patient endorses chronic headache. States she is now feeling short of breath. Denies fever, chills, vomiting. Endorses nausea. Denies chest pain. Endorses some burning at the beginning of urination. Per chart review, patient has significant liver metastasis including adjacent to portal vein. In discussion with family, family reports some evidence of yellowing of eyes over Fourth of July. Family reports worsening of scleral icterus as well as jaundice of skin over the past 3 to 4 days. Family states patient seems to be more sluggish than usual.  Family is reporting patient has been taking large amounts of Excedrin for headache relief. There are no other complaints, changes, or physical findings at this time. PCP: Asha Ren NP    No current facility-administered medications on file prior to encounter. Current Outpatient Medications on File Prior to Encounter   Medication Sig Dispense Refill    dexAMETHasone (DECADRON) 0.5 mg/5 mL elixir Take 10 mL by mouth four (4) times daily for 10 days. 400 mL 0    naloxone (NARCAN) 4 mg/actuation nasal spray Use 1 spray intranasally, then discard.  Repeat with new spray every 2 min as needed for opioid overdose symptoms, alternating nostrils. 1 Each 1    morphine IR (MS IR) 15 mg tablet Take 1 Tablet by mouth every four (4) hours as needed for Pain for up to 10 days. Max Daily Amount: 90 mg. 60 Tablet 0    LORazepam (ATIVAN) 1 mg tablet Take 0.5-1 mg by mouth every four (4) hours as needed for Anxiety.  lidocaine-prilocaine (EMLA) topical cream Apply a quarter size amount to port site 30min - 1 hour and cover with plastic wrap prior to scheduled chemotherapy to numb port site. 30 g 2    ondansetron (ZOFRAN ODT) 4 mg disintegrating tablet Take 1 Tablet by mouth every six (6) hours as needed for Nausea or Vomiting. 30 Tablet 1    prochlorperazine (COMPAZINE) 10 mg tablet Take 0.5 Tablets by mouth every six (6) hours as needed for Nausea or Vomiting. 30 Tablet 1    dextroamphetamine-amphetamine (AdderalL) 30 mg tablet Take 30 mg by mouth two (2) times a day.  LEVONORGESTREL-ETH ESTRA (SAPPHIRE PO) Take 1 Tab by mouth every morning.  citalopram (CELEXA) 10 mg tablet Take 20 mg by mouth daily. Indications: DEPRESSION ASSOCIATED WITH MANIC DEPRESSIVE DISORDER         Past History     Past Medical History:  Past Medical History:   Diagnosis Date    ADHD     Anxiety     Appetite loss     Bipolar depression ([facility] [address]     Burning with urination     Cancer ([facility] [address]     met right breast    Colovesical fistula 4/12/2014    Diverticulitis     Fatigue     Fever chills     Headache(784.0)        Past Surgical History:  Past Surgical History:   Procedure Laterality Date    HX BREAST BIOPSY Left 6/23/2022    . performed by Jess [facility]ou [facility]    HX COLONOSCOPY      HX HEENT      wisdom teeth    HX PARTIAL COLECTOMY      took out 1.5 ft of intestines        Family History:  No family history on file.     Social History:  Social History     Tobacco Use    Smoking status: Current Every Day Smoker     Packs/day: 0.25     Years: 10.00     Pack years: 2.50     Types: Cigarettes    Smokeless tobacco: Never Used   Vaping Use    Vaping Use: Never used   Substance Use Topics    Alcohol use: No    Drug use: No       Allergies: Allergies   Allergen Reactions    Amoxicillin Hives         Review of Systems   Review of Systems   Constitutional: Positive for fatigue. Negative for chills, diaphoresis and fever. HENT: Negative for congestion and sore throat. Eyes: Negative for discharge and visual disturbance. Respiratory: Positive for shortness of breath. Negative for cough. Cardiovascular: Positive for leg swelling. Negative for chest pain. Gastrointestinal: Positive for abdominal distention, diarrhea and nausea. Negative for blood in stool and vomiting. Genitourinary: Negative for difficulty urinating, dysuria and hematuria. Musculoskeletal: Positive for back pain. Negative for gait problem, neck pain and neck stiffness. Skin: Positive for color change. Negative for wound. Neurological: Negative for syncope and light-headedness. Psychiatric/Behavioral: Positive for confusion. Physical Exam   Physical Exam  Constitutional:       Appearance: Normal appearance. She is ill-appearing. HENT:      Head: Normocephalic and atraumatic. Nose: Nose normal. No congestion. Mouth/Throat:      Mouth: Mucous membranes are moist.      Pharynx: Oropharynx is clear. Eyes:      General: Scleral icterus present. Extraocular Movements: Extraocular movements intact. Pupils: Pupils are equal, round, and reactive to light. Cardiovascular:      Rate and Rhythm: Regular rhythm. Tachycardia present. Pulses: Normal pulses. Heart sounds: Normal heart sounds. No murmur heard. Pulmonary:      Breath sounds: Normal breath sounds. Comments: Increased WOB, tachypneic  Abdominal:      General: There is distension. Palpations: Abdomen is soft.       Comments: Mild tenderness throughout   Musculoskeletal: General: Normal range of motion. Cervical back: Normal range of motion. No rigidity or tenderness. Comments: Bilateral 1+ pitting edema   Skin:     General: Skin is warm and dry. Coloration: Skin is jaundiced. Neurological:      Mental Status: She is alert and oriented to person, place, and time. Cranial Nerves: No cranial nerve deficit. Sensory: No sensory deficit. Comments: 4/5 strength in all 4 extremities, answers questions appropriately but answers sluggishly   Psychiatric:         Mood and Affect: Mood normal.         Behavior: Behavior normal.         Diagnostic Study Results     Labs -   No results found for this or any previous visit (from the past 24 hour(s)). Radiologic Studies -   No orders to display     CT Results  (Last 48 hours)    None        CXR Results  (Last 48 hours)    None            Medical Decision Making   I am the first provider for this patient. I reviewed the vital signs, available nursing notes, past medical history, past surgical history, family history and social history. Vital Signs-Reviewed the patient's vital signs. No data found. Records Reviewed: Nursing records and medical records reviewed    MDM:  44-year-old female with metastatic breast cancer including fat involvement presenting with lethargy, diarrhea, shortness of breath, jaundice. Patient is ill-appearing, noticeably jaundiced, tachycardic, hypotensive. Patient is likely in acute liver failure most likely secondary to hepatic metastasis but will also consider viral hepatitis, salicylate toxicity, acetaminophen toxicity, other causes of liver failure. We will also draw blood cultures, broad laboratory work-up. Given tachycardia and shortness of breath setting of active cancer will get CTA to rule out PE. Provider Notes (Medical Decision Making):     ED Course:   Initial assessment performed.  The patients presenting problems have been discussed, and they are in agreement with the care plan formulated and outlined with them. I have encouraged them to ask questions as they arise throughout their visit. ED Course as of 07/14/22 2302   Thu Jul 14, 2022   1847 WBC(!!): 0.4  Recent chemo treatment [DM]   5224 HGB: 11.6 [DM]   9172 PLATELET: 407 [DM]   1856 Lactic acid(!!): 4.9 [DM]   1856 INR(!): 2.1 [DM]   1904 Sodium(!): 130 [DM]   1904 Creatinine(!): 2.23 [DM]   1904 Alk. phosphatase(!): 584 [DM]   1904 AST(!): 178 [DM]   1904 Bilirubin, direct(!): 9.4 [DM]   1904 Bilirubin, total(!): 11.1 [DM]   1904 Labs indicative of liver failure. Given abdominal pain, liver failure, leukopenia will give broad spectrum empiric abx. [DM]   1906 Albumin(!): 2.0  Will give albumin for pressure support [DM]   1934 Acetaminophen level(!): <2 [DM]   0926 Salicylate level(!): <5.0 [DM]   1958 COVID-19 rapid test: Not detected [DM]   1959 Ammonia, plasma: <10 [DM]   1959 ABS. NEUTROPHILS(!): 0.0 [DM]   1959 NT pro-BNP(!): 7,267 [DM]   2027 IMPRESSION  Improvement in the size of the nodular densities throughout the right breast  soft tissue and improvement in the left axillary and retroperitoneal  lymphadenopathy. No gross change in the appearance of the hepatic metastatic  disease and hepatic enlargement given the lack of intravenous contrast. Stable  osseous metastatic disease. Cholelithiasis. Mass effect upon the stomach is  unchanged. No acute abnormality is identified, however, in the chest, abdomen,  or pelvis. [DM]   2028 BP now improved to 103/64 [DM]   2035 Discussed patient with hospitalist for potential admission. Hospitalist requesting ABG.   Will determine suitability for hospital service versus ICU after getting ABG results and continued BP monitoring. [DM]   2130 BICARBONATE(!): 12 [DM]   2130 pH(!): 7.32 [DM]   2130 PCO2(!): 24 [DM]   0825 Hospitalist to admit patient to stepdown [DM]      ED Course User Index  [DM] Osito Lundberg MD               Critical Care:  CRITICAL CARE NOTE :    11:05 PM      IMPENDING DETERIORATION -Cardiovascular, Metabolic, Renal and Hepatic    ASSOCIATED RISK FACTORS - Hypotension, Shock, Metabolic changes and CNS Decompensation    MANAGEMENT- Bedside Assessment and Supervision of Care    INTERPRETATION -  CT Scan, Blood Gases, ECG and Blood Pressure    INTERVENTIONS - hemodynamic mngmt and Empiric antibiotics    CASE REVIEW - Hospitalist/Intensivist    TREATMENT RESPONSE -Stable    PERFORMED BY - Self, and attending (Dr. Cheikh Seals)        NOTES   :      I have spent 60 minutes of critical care time involved in lab review, consultations with specialist, family decision- making, bedside attention and documentation. During this entire length of time I was immediately available to the patient . Jena Bull MD                Disposition:  Admitted to stepdown     DISCHARGE PLAN:  1. Current Discharge Medication List        2. Follow-up Information    None       3. Return to ED if worse     Diagnosis     Clinical Impression:   1. Hypotension, unspecified hypotension type    2. Acute liver failure without hepatic coma    3. Hepatorenal syndrome ([address]    4. Diarrhea, unspecified type    5. Sepsis without acute organ dysfunction, due to unspecified organism ([address]    6. Lactic acidosis    7. Metabolic acidosis    8. Metastatic cancer to liver [facility])        Attestations:    Jena Bull MD    Please note that this dictation was completed with Score[employer], the computer voice recognition software. Quite often unanticipated grammatical, syntax, homophones, and other interpretive errors are inadvertently transcribed by the computer software. Please disregard these errors. Please excuse any errors that have escaped final proofreading. Thank you.

## 2022-07-14 NOTE — ED TRIAGE NOTES
Pt arrived to ER via EMS w/ c/o diarrhea. Pt recently started chemo (THP) on last Friday and started having diarrhea on yesterday. Pt has c/o abdominal pain x 2 days. Pt present jaundice and tachypneic.

## 2022-07-14 NOTE — ED NOTES
Verbal shift change report given to Deric Saini (oncoming nurse) by Gaston Paulson (offgoing nurse). Report included the following information SBAR, Kardex, ED Summary and MAR.

## 2022-07-14 NOTE — TELEPHONE ENCOUNTER
[address] [facility] [address], [address] [facility], [address] [facility], [address] [place] [address] Sabine  1980      Received a message from the infusion center that said Ms. Kaye Can was unable to reach us. I called the sister Carl Hassan and she reports that Ms. Chon Ortega is not doing well. She is extremely week and unable to walk. I recommended she go to the ED for evaluation as her LFTs are elevated. Carl Hassan will relay this information to the sister now. She will call us back with any questions.     Signed By: Misael Huntley NP     July 14, 2022

## 2022-07-15 PROBLEM — A41.9 SEVERE SEPSIS (HCC): Chronic | Status: ACTIVE | Noted: 2022-01-01

## 2022-07-15 PROBLEM — R17 JAUNDICE: Status: ACTIVE | Noted: 2022-01-01

## 2022-07-15 PROBLEM — R65.20 SEVERE SEPSIS (HCC): Chronic | Status: ACTIVE | Noted: 2022-01-01

## 2022-07-15 PROBLEM — E87.20 METABOLIC ACIDOSIS: Status: ACTIVE | Noted: 2022-01-01

## 2022-07-15 NOTE — PROGRESS NOTES
Responded to Code Blue. Patient apneic with a palpable pulse, being ventilated via BVM. 0540: Pulse no longer palpable. Compressions started  0541: Epinephrine 1mg given. 0542: Pulse check, no palpable pulse, no doppler signals. Restarted compressions. 0543: Compressions held for Dr. Shawna Gupta to intubate. 's with + doppler signals to femoral.  0544: Bicarb 1 amp (50meq) given. 0545: BG 63. 2nd amp Bicarb given, 1 amp D50 given (25g). 0546:#7.5 ETT secured at 23cm @ lip after succs 100mg given. 0548: 's. 56: Patient asynchronous with ventilator. Propofol 50mg given. 's. /89, O2 sats 97%. TRANSFER - IN REPORT:    Verbal report received from RN Moy(name) on Gib Decant  being received from PCU(unit) for urgent transfer      Report consisted of patients Situation, Background, Assessment and   Recommendations(SBAR). Information from the following report(s) SBAR, Kardex, MAR, Recent Results and Cardiac Rhythm Sinus tach was reviewed with the receiving nurse. Opportunity for questions and clarification was provided. Patient moved to CCU bed  with multiple RNs, RT and NP on ventilator and monitor. Assessment completed upon patients arrival to unit and care assumed. Restraints ordered and on bed, but not applied to patient as she is unresponsive at this time. 8945: 16F OGT placed, secured at 68cm @ lip, placed to suction. >800ml cloudy green/yellow fluid returned in less than 10 minutes. 9115: Code Blue called for bradycardia/PEA. Compressions started. 3865: Epinephrine 1mg IV given. 8020: Bicarb 1 amp given. Pulse check. No palpable pulse or doppler signals. CPR resumed. 1260:  Pulse check. No palpable pulse or doppler signals. CPR resumed. Bicarb 1 amp given. 6733: Mag sulfate 2gm given IV. Calcium carbonate 1 amp given. 4825: Pulse check. No palpable pulse or doppler signals. CPR resumed. V tach noted on monitor.  Defibrillated with 200joules, CPR immediately resumed. Epinephrine 1mg given. 2553: Bicarb 1 amp given. Amiodarone 300mg given. 6115: Pulse check. No palpable pulse or doppler signals. CPR resumed. V fib on monitor. Debfrillated with 200 joules. CPR immediately resumed. 9258: Pulse check. No palpable pulse or doppler signals. CPR resumed. Amiodarone 150mg IV given. 3027: ROSC achieved. 0854: Bradycardia/PEA. CPR started  1820: Pulse check, weak carotid palpable. Bicarb 1 amp given. Bicarb drip increased to 125ml/hr per NP at bedside. , RR 20, sPO2 42%, BP 76/46.    0646: BP 47/23, , sats 46, RR 20. Bicarb drip free-flowing per NP at bedside. 9174Marina Fennel. CPR resumed. Epinephrine 1mg given. Levophed started at 20mcg/min.  0207: Bicarb 1 amp given. 0635: Pulse check. No palpable pulse or doppler signals. CPR resumed. 0690: Epinephrine 1mg given. 0654: ROSC achieved. , RR 26, Pulse Ox 59%, /33. Vent changes made by Dr. Renae Held to [facility] 20, , FiO2 100%, PEEP +14.     1298: Epinephrine drip started at 5mcg/min per Dr. Renae Held at bedside. Patient asynchronous with ventilator. Patient now being ventilated via % by Dr. Renae Held. 0700: Versed 2mg IV given per Dr. Renae Held. 5009: Precedex started for synchronous ventilations at 0.4mcg/kg/hr. Patient is now DNR with no escalation of care per Dr. Beto Kelly at bedside. , RR 20, SpO2 85%, BP 77/47.  0711: Precedex increased to 0.6mcg/kg/hr for asynchronous ventilations. Bedside and Verbal shift change report given to KRYS Acuña (oncoming nurse) by A. Reyes Quan, RN (offgoing nurse).  Report included the following information SBAR, Kardex, Intake/Output, MAR, Recent Results and Cardiac Rhythm ST.

## 2022-07-15 NOTE — ASSESSMENT & PLAN NOTE
Metastatic breast cancer followed by Dr. Rizwana Gomez who started her on palliative chemotherapy with Taxotere, Transtuzumab and Petuzumab on 7/8/2022.  -Consult oncology

## 2022-07-15 NOTE — H&P
GENERAL GENERIC H&P/CONSULT    CC- jaundice diarrhea    Subjective:    35-year-old female with a past medical history significant for metastatic breast cancer to bones and liver who presents to the emergency department complaining of weakness, jaundice and diarrhea. Of note, she was recently diagnosed with right-sided breast cancer that was found to have metastasized to her liver and bones. She is followed-up by Dr. Farrel Saint who started her on palliative chemotherapy with Taxotere, Transtuzumab and Petuzumab on 7/8/2022. She reports that over the last several days, she has been having abdominal distention, jaundice, diarrhea as well as weakness. She states that she generally feels unwell and that she feels very tired. She states that she has not been sleeping well, but she believes that her tiredness is related to more than that. Otherwise, she denies any fever/chills, headache, dizziness, chest pain, palpitations, cough, shortness of breath, abdominal pain, nausea vomiting, lower extremity swelling, dysuria or hematuria. Her family reports that this yellowing of her skin has been going on for the last week or so. She has no other complaints at this time. In the emergency department, she was found to be persistently tachycardic and tachypneic with a low normal blood pressure. She was found to be afebrile. She was satting well on room air. A CBC was significant for white blood cell count of 0.4, hemoglobin of 11.6 and platelets of 466. Her INR was 2.1. A CMP was significant for a sodium of 130, CO2 of 16, BUN of 39, creatinine of 2.2, bilirubin of 11, albumin of 2.0, ALT of 68, AST of 178 and alk phos of 584. Lactic acid was found to be 4.9 and ammonia was less than 10. A CT scan of her chest abdomen and pelvis showed no acute abnormality. She was given a 1 L normal saline fluid bolus and antibiotics in the emergency department.   She will be admitted to the hospital service and gastroenterology and hematology oncology will be consulted. Past Medical History:   Diagnosis Date    ADHD     Anxiety     Appetite loss     Bipolar depression ([address]     Burning with urination     Cancer [facility])     met right breast    Colovesical fistula 4/12/2014    Diverticulitis     Fatigue     Fever chills     Headache(784.0)       Past Surgical History:   Procedure Laterality Date    HX BREAST BIOPSY Left 6/23/2022    . performed by Sofia Walton MD at \[facility]\"" AMBULATORY OR    HX COLONOSCOPY      HX HEENT      wisdom teeth    HX PARTIAL COLECTOMY      took out 1.5 ft of intestines       Prior to Admission medications    Medication Sig Start Date End Date Taking? Authorizing Provider   dexAMETHasone (DECADRON) 0.5 mg/5 mL elixir Take 10 mL by mouth four (4) times daily for 10 days. 7/11/22 7/21/22  Tish Maxwell NP   naloxone [facility]) 4 mg/actuation nasal spray Use 1 spray intranasally, then discard. Repeat with new spray every 2 min as needed for opioid overdose symptoms, alternating nostrils. 7/11/22   Tish Maxwell NP   morphine IR (MS IR) 15 mg tablet Take 1 Tablet by mouth every four (4) hours as needed for Pain for up to 10 days. Max Daily Amount: 90 mg. 7/11/22 7/21/22  Tish Maxwell NP   LORazepam (ATIVAN) 1 mg tablet Take 0.5-1 mg by mouth every four (4) hours as needed for Anxiety. Provider, Historical   lidocaine-prilocaine (EMLA) topical cream Apply a quarter size amount to port site 30min - 1 hour and cover with plastic wrap prior to scheduled chemotherapy to numb port site.  6/27/22   Gatito Montes MD   ondansetron (ZOFRAN ODT) 4 mg disintegrating tablet Take 1 Tablet by mouth every six (6) hours as needed for Nausea or Vomiting. 6/27/22   Gatito Montes MD   prochlorperazine (COMPAZINE) 10 mg tablet Take 0.5 Tablets by mouth every six (6) hours as needed for Nausea or Vomiting. 6/27/22   Gatito Montes MD   dextroamphetamine-amphetamine (AdderalL) 30 mg tablet Take 30 mg by mouth two (2) times a day. Provider, Historical   LEVONORGESTREL-ETH ESTRA (SAPPHIRE PO) Take 1 Tab by mouth every morning. Provider, Historical   citalopram (CELEXA) 10 mg tablet Take 20 mg by mouth daily. Indications: DEPRESSION ASSOCIATED WITH MANIC DEPRESSIVE DISORDER    Provider, Historical     Allergies   Allergen Reactions    Amoxicillin Hives      Social History     Tobacco Use    Smoking status: Current Every Day Smoker     Packs/day: 0.25     Years: 10.00     Pack years: 2.50     Types: Cigarettes    Smokeless tobacco: Never Used   Substance Use Topics    Alcohol use: No      History reviewed. No pertinent family history. Review of Systems   Constitutional: Positive for fatigue. Negative for chills and fever. HENT: Negative for ear pain, rhinorrhea and sore throat. Eyes: Negative for pain and discharge. Respiratory: Negative for cough, shortness of breath and wheezing. Cardiovascular: Negative for chest pain, palpitations and leg swelling. Gastrointestinal: Positive for abdominal distention, diarrhea and nausea. Negative for abdominal pain, constipation and vomiting. Genitourinary: Negative for dysuria and hematuria. Musculoskeletal: Negative for gait problem and joint swelling. Skin: Negative for rash and wound. Neurological: Positive for weakness. Negative for dizziness, syncope, light-headedness and numbness. Psychiatric/Behavioral: Negative for dysphoric mood. The patient is not nervous/anxious. Objective:    07/14 1901 - 07/15 0700  In: 1100 [I.V.:1100]  Out: -   No intake/output data recorded.   Patient Vitals for the past 8 hrs:   BP Temp Pulse Resp SpO2 Height Weight   07/14/22 2351 111/74 97.7 °F (36.5 °C) (!) 125 23 96 % -- --   07/14/22 2108 95/77 -- (!) 129 24 98 % -- --   07/14/22 2053 93/76 -- (!) 130 25 95 % -- --   07/14/22 2038 118/87 -- (!) 128 30 97 % -- --   07/14/22 2023 104/63 -- (!) 127 30 95 % -- --   07/14/22 2008 108/68 -- -- -- -- -- --   07/14/22 1938 99/60 -- (!) 127 27 95 % -- --   07/14/22 1902 (!) 84/60 -- (!) 128 (!) 43 96 % -- --   07/14/22 1847 (!) 83/53 -- (!) 127 (!) 34 96 % -- --   07/14/22 1832 (!) 96/51 -- (!) 129 28 96 % -- --   07/14/22 1823 (!) 82/63 -- (!) 129 (!) 33 96 % -- --   07/14/22 1817 -- -- -- -- 97 % -- --   07/14/22 1808 (!) 99/58 -- (!) 129 28 97 % 5' 1\" (1.549 m) 140.6 kg (310 lb)     Physical Exam  Constitutional:       General: She is not in acute distress. Appearance: She is obese. She is ill-appearing. She is not toxic-appearing or diaphoretic. HENT:      Head: Normocephalic and atraumatic. Mouth/Throat:      Mouth: Mucous membranes are moist.      Pharynx: Oropharynx is clear. Eyes:      Extraocular Movements: Extraocular movements intact. Pupils: Pupils are equal, round, and reactive to light. Cardiovascular:      Rate and Rhythm: Normal rate and regular rhythm. Pulses: Normal pulses. Heart sounds: No murmur heard. Pulmonary:      Effort: Pulmonary effort is normal. No respiratory distress. Breath sounds: Normal breath sounds. No wheezing. Abdominal:      General: Abdomen is flat. Bowel sounds are normal. There is distension. Palpations: Abdomen is soft. Tenderness: There is no abdominal tenderness. Musculoskeletal:         General: No swelling or tenderness. Cervical back: Normal range of motion and neck supple. Right lower leg: No edema. Left lower leg: No edema. Skin:     General: Skin is warm and dry. Coloration: Skin is jaundiced. Neurological:      General: No focal deficit present. Mental Status: She is alert and oriented to person, place, and time. Mental status is at baseline.    Psychiatric:         Mood and Affect: Mood normal.         Behavior: Behavior normal.          Labs:    Recent Results (from the past 24 hour(s))   CBC WITH AUTOMATED DIFF    Collection Time: 07/14/22  5:49 PM   Result Value Ref Range    WBC 0.4 (LL) 3.6 - 11.0 K/uL    RBC 4.15 3.80 - 5.20 M/uL    HGB 11.6 11.5 - 16.0 g/dL    HCT 35.9 35.0 - 47.0 %    MCV 86.5 80.0 - 99.0 FL    MCH 28.0 26.0 - 34.0 PG    MCHC 32.3 30.0 - 36.5 g/dL    RDW 20.8 (H) 11.5 - 14.5 %    PLATELET 587 755 - 749 K/uL    NRBC 5.1 (H) 0  WBC    ABSOLUTE NRBC 0.02 (H) 0.00 - 0.01 K/uL    NEUTROPHILS 12 (L) 32 - 75 %    LYMPHOCYTES 76 (H) 12 - 49 %    MONOCYTES 12 5 - 13 %    EOSINOPHILS 0 0 - 7 %    BASOPHILS 0 0 - 1 %    IMMATURE GRANULOCYTES 0 0.0 - 0.5 %    TOTAL CELLS COUNTED 25      ABS. NEUTROPHILS 0.0 (L) 1.8 - 8.0 K/UL    ABS. LYMPHOCYTES 0.4 (L) 0.8 - 3.5 K/UL    ABS. MONOCYTES 0.0 0.0 - 1.0 K/UL    ABS. EOSINOPHILS 0.0 0.0 - 0.4 K/UL    ABS. BASOPHILS 0.0 0.0 - 0.1 K/UL    ABS. IMM.  GRANS. 0.0 0.00 - 0.04 K/UL    DF MANUAL      PLATELET COMMENTS Large Platelets      RBC COMMENTS NORMOCYTIC, NORMOCHROMIC     PROTHROMBIN TIME + INR    Collection Time: 07/14/22  5:49 PM   Result Value Ref Range    INR 2.1 (H) 0.9 - 1.1      Prothrombin time 20.6 (H) 9.0 - 06.5 sec   METABOLIC PANEL, BASIC    Collection Time: 07/14/22  5:49 PM   Result Value Ref Range    Sodium 130 (L) 136 - 145 mmol/L    Potassium 4.5 3.5 - 5.1 mmol/L    Chloride 100 97 - 108 mmol/L    CO2 16 (L) 21 - 32 mmol/L    Anion gap 14 5 - 15 mmol/L    Glucose 98 65 - 100 mg/dL    BUN 39 (H) 6 - 20 MG/DL    Creatinine 2.23 (H) 0.55 - 1.02 MG/DL    BUN/Creatinine ratio 17 12 - 20      GFR est AA 29 (L) >60 ml/min/1.73m2    GFR est non-AA 24 (L) >60 ml/min/1.73m2    Calcium 8.1 (L) 8.5 - 10.1 MG/DL   TROPONIN-HIGH SENSITIVITY    Collection Time: 07/14/22  5:49 PM   Result Value Ref Range    Troponin-High Sensitivity <4 0 - 51 ng/L   HEPATIC FUNCTION PANEL    Collection Time: 07/14/22  5:49 PM   Result Value Ref Range    Protein, total 7.0 6.4 - 8.2 g/dL    Albumin 2.0 (L) 3.5 - 5.0 g/dL    Globulin 5.0 (H) 2.0 - 4.0 g/dL    A-G Ratio 0.4 (L) 1.1 - 2.2      Bilirubin, total 11.1 (H) 0.2 - 1.0 MG/DL    Bilirubin, direct 9.4 (H) 0.0 - 0.2 MG/DL    Alk. phosphatase 584 (H) 45 - 117 U/L    AST (SGOT) 178 (H) 15 - 37 U/L    ALT (SGPT) 68 12 - 78 U/L   LIPASE    Collection Time: 07/14/22  5:49 PM   Result Value Ref Range    Lipase 27 (L) 73 - 393 U/L   MAGNESIUM    Collection Time: 07/14/22  5:49 PM   Result Value Ref Range    Magnesium 2.7 (H) 1.6 - 2.4 mg/dL   LACTIC ACID    Collection Time: 07/14/22  5:49 PM   Result Value Ref Range    Lactic acid 4.9 (HH) 0.4 - 2.0 MMOL/L   NT-PRO BNP    Collection Time: 07/14/22  5:49 PM   Result Value Ref Range    NT pro-BNP 7,267 (H) <125 PG/ML   AMMONIA    Collection Time: 07/14/22  6:10 PM   Result Value Ref Range    Ammonia, plasma <10 <32 UMOL/L   COVID-19 RAPID TEST    Collection Time: 07/14/22  6:10 PM   Result Value Ref Range    Specimen source Nasopharyngeal      COVID-19 rapid test Not detected NOTD     SALICYLATE    Collection Time: 07/14/22  6:10 PM   Result Value Ref Range    Salicylate level <6.4 (L) 2.8 - 20.0 MG/DL   HEPATITIS PANEL, ACUTE    Collection Time: 07/14/22  6:10 PM   Result Value Ref Range    Hepatitis A, IgM PENDING     __          Hepatitis B surface Ag PENDING Index    Hep B surface Ag Interp. PENDING     __          Hepatitis B core, IgM PENDING     __          Hepatitis C virus Ab PENDING Index    Hep C virus Ab Interp.  PENDING    ACETAMINOPHEN    Collection Time: 07/14/22  6:10 PM   Result Value Ref Range    Acetaminophen level <2 (L) 10 - 30 ug/mL   BLOOD GAS, ARTERIAL    Collection Time: 07/14/22  9:24 PM   Result Value Ref Range    pH 7.32 (L) 7.35 - 7.45      PCO2 24 (L) 35 - 45 mmHg    PO2 100 80 - 100 mmHg    O2 SAT 97 92 - 97 %    BICARBONATE 12 (L) 22 - 26 mmol/L    BASE DEFICIT 11.7 mmol/L    O2 METHOD ROOM AIR      Sample source ARTERIAL      SITE LEFT RADIAL      RYAN'S TEST YES     LACTIC ACID    Collection Time: 07/14/22 10:44 PM   Result Value Ref Range    Lactic acid 5.1 (HH) 0.4 - 2.0 MMOL/L         Assessment:  Active Problems:    Metast[facility] cancer ([facility] (6/24/2022)      Severe sepsis ([facility] (8/26/6037)      Metabolic acidosis (7/29/4056)      Jaundice (7/15/2022)        Plan:    51-year-old female with a past medical history significant for metastatic breast cancer to bones and liver who presents to the emergency department complaining of weakness, jaundice and diarrhea. She was found to be tachycardic, tachypneic, with a low normal blood pressure and white blood cell count of 0.5 and bilirubin of 11 in the emergency department. She will be treated for severe sepsis on the hospitalist service with Vanco, Levaquin and Flagyl, metabolic acidosis will be corrected with bicarb drip and gastroenterology and oncology will be consulted. Severe sepsis (HCC)  Severe sepsis from likely GI source. Patient with tachycardia, tachypnea and white blood cell count of 0.5. Lactic acid greater than 4. Given fluid bolus in the emergency department.  -Vanco, Levaquin and Flagyl  -Blood cultures obtained in the emergency department, will follow  -Procalcitonin in the morning  -Telemetry    Metastatic breast cancer [facility])  Metastatic breast cancer followed by Dr. Lisa Meléndez who started her on palliative chemotherapy with Taxotere, Transtuzumab and Petuzumab on 7/8/2022.  -Consult oncology    Metabolic acidosis  Metabolic acidosis is seen on ABG. -Bicarb drip  -Albumin  -Recheck VBG in the morning    Jaundice  Bilirubin of 11. Patient with known liver metastases. CT abdomen and pelvis shows no acute findings  -Consult gastroenterology    50cc/hr bicarb drip  Hyponatremia  HH diet    No chemo DVT prophylaxis with INR of 2.1    Full Code    Patient was discussed with the emergency department physician.     Signed:  Rica Julien MD 7/15/2022

## 2022-07-15 NOTE — ASSESSMENT & PLAN NOTE
Severe sepsis from likely GI source. Patient with tachycardia, tachypnea and white blood cell count of 0.5. Lactic acid greater than 4.   Given fluid bolus in the emergency department.  -Татьянаo Levaquin and Flagyl  -Blood cultures obtained in the emergency department, will follow  -Procalcitonin in the morning  -Telemetry

## 2022-07-15 NOTE — ANESTHESIA PROCEDURE NOTES
Emergent Intubation  Performed by: Burgess Carson MD  Authorized by: Burgess Carson MD     Emergent Intubation:   Location:  ICU  Date/Time:  7/15/2022 5:50 AM  Indications:  Respiratory failure    Spontaneous Ventilation: absent    Level of Consciousness: unresponsive  Preoxygenated:  Yes      Airway Documentation:   Airway:  ETT - Cuffed  Technique:  Cricoid pressure  Advanced Technique:  Glide scope  Blade Size:  4  ETT size (mm):  7.5  ETT Line Timothy:  Teeth  ETT Insertion depth (cm):  3  Placement verified by: auscultation, EtCO2, BBS and fiber optic    Attempts:  1  Difficult airway: No    Patient in code situation  CPR in progress  Patient unresponsive  Given 100 mg Anectine  Direct Laryngoscopy with LARGE amount of vomit noted in airway and oral pharynx  Suctioned immediately and immediately intubated  +BBS +ETCO2  Care given over to attending physician

## 2022-07-15 NOTE — ROUTINE PROCESS
Sister of patient called requesting that her mother who just arrived be able to say goodbye to her daughter. Patient brought to an empty pod in the ED with permission from charge. Cleaned up as much at possible to be presentable. Mother and sister arrived and said their goodbyes. Patient taken back to the [facility].

## 2022-07-15 NOTE — PROGRESS NOTES
Responded to code blue. Patient got up from the bathroom and went unresponsive. Prior to this, she was agitated and received haldol. She was unresponsive and placed in the bed from the bedside commode. She then stopped breathing and a code blue was called. Multiple rounds of compressions done with Epi x2 given. Found to be in sinus tachycardia with ROSC. Intubated by anesthesia at bedside.   BP stable.   -2 amps sodium bicarb  -STAT CXR  -ABG 1 hour post cardiac arrest  -propofol    Attempted to reach sister and emergency contact Ryan Hare at [phone] without an answer and francie Allison at [phone] and went straight to voicemail

## 2022-07-15 NOTE — PROGRESS NOTES
Arrived approximately 645 to code blue in progress. According to ICU staff this is the 4th code blue that Ms Deandra Garcia has undergone in the past 12 hours. She has remained hypoxic acidotic hypotensive and non responsive despite maximal efforts. Several attempts were made to notify family but were unsuccessful. She has a history of metastatic breast ca on palliative chemo and was admitted recently with severe sepsis. She has no significant chance of surviving hospitalization with functional recovery. Further ACLS/CPR would be futile and in fact harmful and will not be offered. We will continue to attempt to contact the family .      Carl Collado MD [address] [place] [address],# [phone]

## 2022-07-15 NOTE — ACP (ADVANCE CARE PLANNING)
I have reviewed this tragic case and am asked to offer opinion regarding further ACLS interventions. She has metastatic breast cancer with multiple organ system failure. She was on \"palliative chemotherapy\" prior to admission. She has suffered multiple cardiac arrests today. She is presently hypoxic despite maximum vent support. At this point, her chance of surviving this hospitalization is nil as is her chance of functional recovery. There is no role for further ACLS/CPR at this juncture which would be futile and undignified for all involved including the patient.  We will continue to try to reach the patient's family to inform them of her tragic illness    Shaunna Wasserman, [address] [facility]  [phone]  7/15/2022 7:06 AM

## 2022-07-15 NOTE — PROGRESS NOTES
RAPID RESPONSE TEAM    Overhead rapid response paged to room # 2512/01  at [address]    Reason for rapid response: Unstable vital signs    Initial assessment: upon arrival, patient is AAO, diaphoretic, vomiting and moaning. Patient appears labored with complaints of SOB, RR 40s, placed on 2L NC and ABG obtained. Sinus tach on monitor HR 140s, EKG obtained, BP 80s/50s 250 ml NS bolus given.      Dr. Aditya Marin at bedside, orders received for 50g IV Albumin, IV Compazine, Bicarb and PO Ativan.     0529- CODE BLUE CALLED- see code blue documentation    Patient Vitals for the past 4 hrs:   Temp Pulse Resp BP SpO2   07/15/22 0607 -- (!) 125 20 -- --   07/15/22 0538 -- (!) 114 23 110/73 --   07/15/22 0448 -- (!) 150 24 (!) 102/45 99 %   07/15/22 0443 98.3 °F (36.8 °C) (!) 147 25 (!) 95/31 --   07/15/22 0409 -- (!) 140 (!) 33 (!) 85/56 95 %          Interventions:     - EKG  -STAT ABG  -250cc fluid bolus  -50g IV albumin  -1mg PO ativan  -IV compazine for nausea    Outcome: patient appears to be more comfortable, states SOB resolved, BP improved with fluid bolus, pt to remain in 2512/01     Please call with any questions or concerns    Gautam Herrera RN  Rapid Response Team  Ext [phone]     Recent Results (from the past 8 hour(s))   LACTIC ACID    Collection Time: 07/15/22  3:22 AM   Result Value Ref Range    Lactic acid 5.9 (HH) 0.4 - 2.0 MMOL/L   METABOLIC PANEL, COMPREHENSIVE    Collection Time: 07/15/22  3:22 AM   Result Value Ref Range    Sodium 130 (L) 136 - 145 mmol/L    Potassium 4.6 3.5 - 5.1 mmol/L    Chloride 101 97 - 108 mmol/L    CO2 14 (LL) 21 - 32 mmol/L    Anion gap 15 5 - 15 mmol/L    Glucose 68 65 - 100 mg/dL    BUN 44 (H) 6 - 20 MG/DL    Creatinine 2.77 (H) 0.55 - 1.02 MG/DL    BUN/Creatinine ratio 16 12 - 20      GFR est AA 23 (L) >60 ml/min/1.73m2    GFR est non-AA 19 (L) >60 ml/min/1.73m2    Calcium 7.5 (L) 8.5 - 10.1 MG/DL    Bilirubin, total 10.7 (H) 0.2 - 1.0 MG/DL    ALT (SGPT) 65 12 - 78 U/L    AST (SGOT) 169 (H) 15 - 37 U/L    Alk. phosphatase 455 (H) 45 - 117 U/L    Protein, total 6.4 6.4 - 8.2 g/dL    Albumin 2.0 (L) 3.5 - 5.0 g/dL    Globulin 4.4 (H) 2.0 - 4.0 g/dL    A-G Ratio 0.5 (L) 1.1 - 2.2     CBC WITH AUTOMATED DIFF    Collection Time: 07/15/22  3:22 AM   Result Value Ref Range    WBC 0.3 (LL) 3.6 - 11.0 K/uL    RBC 3.98 3.80 - 5.20 M/uL    HGB 11.1 (L) 11.5 - 16.0 g/dL    HCT 35.3 35.0 - 47.0 %    MCV 88.7 80.0 - 99.0 FL    MCH 27.9 26.0 - 34.0 PG    MCHC 31.4 30.0 - 36.5 g/dL    RDW 21.2 (H) 11.5 - 14.5 %    PLATELET 871 924 - 114 K/uL    NRBC 6.9 (H) 0  WBC    ABSOLUTE NRBC 0.02 (H) 0.00 - 0.01 K/uL    NEUTROPHILS 12 (L) 32 - 75 %    LYMPHOCYTES 72 (H) 12 - 49 %    MONOCYTES 16 (H) 5 - 13 %    EOSINOPHILS 0 0 - 7 %    BASOPHILS 0 0 - 1 %    IMMATURE GRANULOCYTES 0 0.0 - 0.5 %    TOTAL CELLS COUNTED 25      ABS. NEUTROPHILS 0.0 (L) 1.8 - 8.0 K/UL    ABS. LYMPHOCYTES 0.3 (L) 0.8 - 3.5 K/UL    ABS. MONOCYTES 0.0 0.0 - 1.0 K/UL    ABS. EOSINOPHILS 0.0 0.0 - 0.4 K/UL    ABS. BASOPHILS 0.0 0.0 - 0.1 K/UL    ABS. IMM. GRANS. 0.0 0.00 - 0.04 K/UL    DF MANUAL      RBC COMMENTS ANISOCYTOSIS  1+        RBC COMMENTS TARGET CELLS  PRESENT        WBC COMMENTS Differential performed on buffy coat smear.      GLUCOSE, POC    Collection Time: 07/15/22  4:11 AM   Result Value Ref Range    Glucose (POC) 79 65 - 117 mg/dL    Performed by Basilia Haile RN    BLOOD GAS, ARTERIAL    Collection Time: 07/15/22  4:20 AM   Result Value Ref Range    pH 7.22 (LL) 7.35 - 7.45      PCO2 25 (L) 35 - 45 mmHg    PO2 94 80 - 100 mmHg    O2 SAT 96 92 - 97 %    BICARBONATE 10 (L) 22 - 26 mmol/L    BASE DEFICIT 15.9 mmol/L    O2 METHOD NASAL CANNULA      O2 FLOW RATE 2.00 L/min    Sample source ARTERIAL      SITE RIGHT RADIAL      RYAN'S TEST YES      Critical value read back Called to Dana Polanco MD on 07/15/2022 at 04:23    GLUCOSE, POC    Collection Time: 07/15/22  5:45 AM   Result Value Ref Range    Glucose (POC) 63 (L) 65 - 117 mg/dL    Performed by Akua CHOWDHURY

## 2022-07-15 NOTE — PROGRESS NOTES
2340 TRANSFER - IN REPORT:    Verbal report received from EDEN boudreaux(name) on Terese Jacques  being received from ED(unit) for routine progression of care      Report consisted of patients Situation, Background, Assessment and   Recommendations(SBAR). Information from the following report(s) SBAR, Kardex, ED Summary, Intake/Output, MAR and Recent Results was reviewed with the receiving nurse. Opportunity for questions and clarification was provided. Assessment completed upon patients arrival to unit and care assumed. Dee Notified MD Liu: Pt just arrived to the floor from the ED with sepsis. Her lactic just came back at 5.1 and her updated mews score is a 4. She is working hard to breath but Spo2 is 96% on RA. She is Lethargic but oriented. What do you recommend? MD placed order for 1 amp of bicarb    Primary RN continues to educate pt on how continuing to move from bed to chair can be a safety risk with being hypotensive and tachycardic. Pt continues to insist on getting up.     0104 Pt has requested to receive ativan for anxiety. Pt A&O x4. 0.5mg ativan given. 0406 Rapid response called for hypotension, elevated mews, anxiety/restlessness, elevated RR. See RRT note for details    0530 Pt up in chair resting with sitter at bedside. Pt needing to void assisted to use female urinal but unsuccessful. Pt continues to insist on getting onto the bedside commode. Pt then assisted to bedside commode with minimal assistance. Shortly after pt starts to become very lethargic. Assistance called to bedside to get pt back to bed.     0535 Rapid response called. 8349 Code blue called. See MD note for code blue details. Bedside and Verbal report given to Hillary Soto RN. Report included the following information SBAR, Kardex, ED Summary, Intake/Output, MAR and Recent Results.

## 2022-07-15 NOTE — PROGRESS NOTES
was given information from the office  to provide care for family when they arrive to ER.  provided care earlier to the family and was aware the nurse would page once the mother arrived from [facility]. The nurse stated the mother arrived and stayed one to two minutes. She hugged her daughter and left.  thanked the nurse for bringing Ms. Orin Rivera from the [facility] to the ER and for the care provided to the patient.  Rev. LUANA Arreola  [facility]   Paging Service [phone]

## 2022-07-15 NOTE — ED NOTES
Blood Cultures were already draw on this patient with Sepsis bundle for ED portion of encounter. She also has rec'd Admission order for Blood Cultures will be d/c'd a dose of antx as ordered.

## 2022-07-15 NOTE — DEATH NOTE
Death Pronouncement Note    Patient's Name: Alfredo Jacobo   Patient's YOB: 1980  MRN Number: [id number]    Admitting Provider: Maxime Miramontes MD  Attending Provider: Mauricio Braga MD     Patient was examined and the following were absent: Pulses, Blood Pressure and Respiratory effort    I declared the patient dead on  at [phone] on 07/15/2022    Preliminary Cause of Death: Severe sepsis, Complications of breast cancer      Electronically signed by Kiran Orellana MD on 7/15/2022 at 7:53 AM

## 2022-07-15 NOTE — CONSULTS
[facility]    ICU TEAM Progress Note    Name: Marta Mishra   : 1980   MRN: [id number]   Date: 7/15/2022           ICU Assessment     1. Sepsis  2. Respiratory Failure  3. Acute Kidney Injury  4. Severe Sepsis (Sepsis with 1 or more end-organ failures)  5. Lactic Acidosis  6. Respiratory Arrest  7. Metastatic breast cancer           ICU Comprehensive Plan of Care:     1. Neurological System  Aggressive Pain Control  Spontaneous Awakening Trial: Pending  Pain Medications: Fentanyl  Target RASS: 0 - Alert & Calm - Spontaneously pays attention to caregiver  Sedation Medications: Precedex    2. Cardiovascular System  SBP Goal of: > 90 mmHg  MAP Goal of: > 65 mmHg  Cardiac Gtts: None - For above SBP/MAP goals  Transfusion Trigger (Hgb): <7 g/dL  Keep K > 4; Mg > 2     3. Respiratory System  Vent Goals: Head of bed > 30 degrees  Optimize PEEP/Ventilation/Oxygenation  Aim for lung protective ventilation  Maintain Vt 4-8 cc/kg IBW  Aggressive bronchopulmonary hygiene  Pulmonary toilet: Duo-Nebs   SpO2 Goal: > 92%    4. Renal/GI/Endocrine System  IVFs: NaHCO3  GI Prophylaxis: Protonix (pantoprazole)   Bowel Regimen: None needed at this time  Feeding:  Pending   Blood Sugar Goal 120-180 - Glycemic Control: Insulin    5. Heme  Transfusion Trigger (Hgb): <7 g/dL  DVT Prophylaxis: SCD's or Sequential Compression Device and Heparin     6. Infectious Disease  Antibiotics: Flagyl  Levofloxacin  Vancomycin  Tubes: ETT  Lines: Peripheral IV and Central Line  Drains: None    7. PT/OT  Mobility: Poor and Up with assistance  PT/OT: pending   Restraints: Soft wrist restraints    8. Goals of Care   Discussed Plan of Care (goals of care): No  Addressed Code Status: Full Code    9. Discussed Care Plan with Bedside RN    10.  Documentation of Current Medications    Subjective:   Progress Note: 7/15/2022      Reason for ICU Admission:   Resp arrest, acidosis     HPI:  Per Hospitalist H&P:  49-year-old female with a past medical history significant for metastatic breast cancer to bones and liver who presents to the emergency department complaining of weakness, jaundice and diarrhea. Of note, she was recently diagnosed with right-sided breast cancer that was found to have metastasized to her liver and bones. She is followed-up by Dr. Michelle Bentley who started her on palliative chemotherapy with Taxotere, Transtuzumab and Petuzumab on 7/8/2022. She reports that over the last several days, she has been having abdominal distention, jaundice, diarrhea as well as weakness. She states that she generally feels unwell and that she feels very tired. She states that she has not been sleeping well, but she believes that her tiredness is related to more than that. Otherwise, she denies any fever/chills, headache, dizziness, chest pain, palpitations, cough, shortness of breath, abdominal pain, nausea vomiting, lower extremity swelling, dysuria or hematuria. Her family reports that this yellowing of her skin has been going on for the last week or so. In ED, labs notable for WBC 0.4, Hgb 11.6 and . INR 2.1. Na 130, CO2  16, BUN 39, creatinine 2.2, bilirubin 11, albumin 2.0, ALT 68,  and alk phos 584. Lactic acid 4.9. CT scan chest abdomen and pelvis showed no acute abnormality. On floor, tachycardic and tachypnic, agitated. Tx with ativan and haldol. Progressed to RRT and ultimately resp arrest into PEA, ACLS x 2 rounds with epi and NaHCO3 and intubation with ROSC. Notable aspiration on intubation. In ICU, bradycardic to PEA code ACLS with mult rounds of epi, NaHCO3 with shockable rhythm, DCCV with ROSC. Then bradycardic to PEA codes x2 with multiple rounds of ACLS, epi, NaHCO3 and ROSC. Started NE and Epi gtts. Attempted to call family at numbers in chart and left voicemail's where able. Discussed case with Emiliano Valle and Jose, no more ACLS, DNR.     S/P:       Active Problem List:     Problem List  Date Reviewed: 7/15/2022          Codes Class    Metabolic acidosis [id number]-[id number]: E87.2  ICD-9-CM: 276.2         Jaundice ICD-10-CM: R17  ICD-9-CM: 782.4         Severe sepsis (HCC) (Chronic) ICD-10-CM: A41.9, R65.20  ICD-9-CM: 038.9, 995.92         Metastatic breast cancer ([facility] [address] ICD-10-CM: C50.919  ICD-9-CM: 174.9         Morbid obesity (HCC) ICD-10-CM: E66.01  ICD-9-CM: 278.01         Colovesical fistula ICD-10-CM: N32.1  ICD-9-CM: 596.1         History of diverticulitis of colon ICD-10-CM: Z87.19  ICD-9-CM: V12.79         Pelvic abscess ICD-9-CM: [id number]               Past Medical History:      has a past medical history of ADHD, Anxiety, Appetite loss, Bipolar depression ([facility] [address] Burning with urination, Cancer ([facility] [address] Colovesical fistula (4/12/2014), Diverticulitis, Fatigue, Fever chills, and Headache(784.0). Past Surgical History:      has a past surgical history that includes hx heent; hx partial colectomy; hx colonoscopy; and hx breast biopsy (Left, 6/23/2022). Home Medications:     Prior to Admission medications    Medication Sig Start Date End Date Taking? Authorizing Provider   dexAMETHasone (DECADRON) 0.5 mg/5 mL elixir Take 10 mL by mouth four (4) times daily for 10 days. 7/11/22 7/21/22  Cristian Schaefer NP   naloxone [facility]) 4 mg/actuation nasal spray Use 1 spray intranasally, then discard. Repeat with new spray every 2 min as needed for opioid overdose symptoms, alternating nostrils. 7/11/22   Cristian Schaefer NP   morphine IR (MS IR) 15 mg tablet Take 1 Tablet by mouth every four (4) hours as needed for Pain for up to 10 days. Max Daily Amount: 90 mg. 7/11/22 7/21/22  Cristian Schaefer NP   LORazepam (ATIVAN) 1 mg tablet Take 0.5-1 mg by mouth every four (4) hours as needed for Anxiety. Provider, Historical   lidocaine-prilocaine (EMLA) topical cream Apply a quarter size amount to port site 30min - 1 hour and cover with plastic wrap prior to scheduled chemotherapy to numb port site.  6/27/22   Wayne, Yelitza Reese MD   ondansetron (ZOFRAN ODT) 4 mg disintegrating tablet Take 1 Tablet by mouth every six (6) hours as needed for Nausea or Vomiting. 22   Santi Rosenbaum MD   prochlorperazine (COMPAZINE) 10 mg tablet Take 0.5 Tablets by mouth every six (6) hours as needed for Nausea or Vomiting. 22   Santi Rosenbaum MD   dextroamphetamine-amphetamine (AdderalL) 30 mg tablet Take 30 mg by mouth two (2) times a day. Provider, Historical   LEVONORGESTREL-ETH ESTRA (SAPPHIRE PO) Take 1 Tab by mouth every morning. Provider, Historical   citalopram (CELEXA) 10 mg tablet Take 20 mg by mouth daily. Indications: DEPRESSION ASSOCIATED WITH MANIC DEPRESSIVE DISORDER    Provider, Historical       Allergies/Social/Family History: Allergies   Allergen Reactions    Amoxicillin Hives      Social History     Tobacco Use    Smoking status: Current Every Day Smoker     Packs/day: 0.25     Years: 10.00     Pack years: 2.50     Types: Cigarettes    Smokeless tobacco: Never Used   Substance Use Topics    Alcohol use: No      History reviewed. No pertinent family history. Review of Systems:     A comprehensive review of systems was negative except for that written in the HPI. Objective:   Vital Signs:  Visit Vitals  BP (!) 95/31   Pulse (!) 147   Temp 98.3 °F (36.8 °C)   Resp 25   Ht 5' 1\" (1.549 m)   Wt 140.6 kg (310 lb)   SpO2 97%   BMI 58.57 kg/m²      O2 Device: None (Room air) Temp (24hrs), Av.3 °F (36.8 °C), Min:97.7 °F (36.5 °C), Max:98.8 °F (37.1 °C)           Intake/Output:     Intake/Output Summary (Last 24 hours) at 7/15/2022 0601  Last data filed at 2022 2302  Gross per 24 hour   Intake 1100 ml   Output --   Net 1100 ml       Physical Exam:  General:  Sedated and on the ventilator. No acute distress. Eyes:  Sclera anicteric. Pupils equal, round, reactive to light. Mouth/Throat: Orotracheal tube in place. Neck: Supple. Lungs:   Coarse to auscultation bilaterally, good effort. Cardiovascular:  Tachycardic rate and rhythm, no murmur, click, rub, or gallop. Abdomen:   Soft, non-tender, non-distended. Extremities: No cyanosis or edema. Skin: No acute rash or lesions. Lymph Nodes: Cervical and supraclavicular normal.   Musculoskeletal:  No swelling or deformity. Lines/Devices:  Intact, no erythema, drainage, or tenderness. Psychiatric: Sedated and appears comfortable on ventilator. Ventilator Settings:  Mode Rate Tidal Volume Pressure FiO2 PEEP                    Peak airway pressure:      Minute ventilation:        LABS AND  DATA: Personally reviewed  Recent Labs     07/15/22  0322 07/14/22  1749   WBC 0.3* 0.4*   HGB 11.1* 11.6   HCT 35.3 35.9    273     Recent Labs     07/15/22  0322 07/14/22  1749   * 130*   K 4.6 4.5    100   CO2 14* 16*   BUN 44* 39*   CREA 2.77* 2.23*   GLU 68 98   CA 7.5* 8.1*   MG  --  2.7*     Recent Labs     07/15/22  0322 07/14/22  1749   * 584*   TP 6.4 7.0   ALB 2.0* 2.0*   GLOB 4.4* 5.0*   LPSE  --  27*     Recent Labs     07/14/22 1749   INR 2.1*   PTP 20.6*      No results for input(s): PHI, PCO2I, PO2I, FIO2I in the last 72 hours. No results for input(s): CPK, CKMB, TROIQ, BNPP in the last 72 hours. Chest X-Ray: personally reviewed and report checked    CXR Results  (Last 48 hours)    None          EKG:  none    ECHO:  none    CH/A&P CT:  IMPRESSION  Improvement in the size of the nodular densities throughout the right breast  soft tissue and improvement in the left axillary and retroperitoneal  lymphadenopathy. No gross change in the appearance of the hepatic metastatic  disease and hepatic enlargement given the lack of intravenous contrast. Stable  osseous metastatic disease. Cholelithiasis. Mass effect upon the stomach is  unchanged. No acute abnormality is identified, however, in the chest, abdomen,  or pelvis.     MEDS: Reviewed    Multidisciplinary Rounds Completed:  Pending    ABCDEF Bundle/Checklist Completed:  Yes    DISPOSITION  Stay in ICU    CRITICAL CARE CONSULTANT NOTE  I had a face to face encounter with the patient, reviewed and interpreted patient data including clinical events, labs, images, vital signs, I/O's, and examined patient. I have discussed the case and the plan and management of the patient's care with the consulting services, the bedside nurses and the respiratory therapist.      NOTE OF PERSONAL INVOLVEMENT IN CARE   This patient has a high probability of imminent, clinically significant deterioration, which requires the highest level of preparedness to intervene urgently. I participated in the decision-making and personally managed or directed the management of the following life and organ supporting interventions that required my frequent assessment to treat or prevent imminent deterioration. I personally spent 65 minutes of critical care time. This is time spent at this critically ill patient's bedside actively involved in patient care as well as the coordination of care. This does not include any procedural time which has been billed separately.     DARIEL Barr  Advanced Practice Provider  [facility]  7/15/2022

## 2022-07-15 NOTE — ED NOTES
.Patient is being transferred to [address], Room # [phone]. Report given to EDEN Moran on [facility] for routine progression of care. Report consisted of the following information SBAR, ED Summary, Intake/Output, Recent Results and Cardiac Rhythm ST. Patient transferred to receiving unit by: Jai Alcocer (RN or tech name). Outstanding consults needed: Yes, as ordered need to be called in    Next labs due: Yes, as ordered    The following personal items will be sent with the patient during transfer to the floor: All valuables:    Cardiac monitoring ordered: Yes, as ordered    The following CURRENT information was reported to the receiving RN:    Code status: Full Code at time of transfer    Last set of vital signs:  Vital Signs  Level of Consciousness: Alert (0) (07/14/22 1808)  Pulse (Heart Rate): (!) 129 (07/14/22 2108)  Heart Rate Source: Monitor (07/14/22 1823)  Cardiac Rhythm: Sinus Tachy (07/14/22 1817)  Resp Rate: 24 (07/14/22 2108)  BP: 95/77 (07/14/22 2108)  MAP (Monitor): 84 (07/14/22 2108)  MAP (Calculated): 83 (07/14/22 2108)  BP 1 Location: Left lower arm (07/14/22 1823)  BP 1 Method: Automatic (07/14/22 1823)  BP Patient Position: Sitting (07/14/22 1823)         Oxygen Therapy  O2 Sat (%): 98 % (07/14/22 2108)  Pulse via Oximetry: 127 beats per minute (07/14/22 2108)  O2 Device: None (Room air) (07/14/22 1823)      Last pain assessment:  Pain 1  Pain Scale 1: Numeric (0 - 10)      Wounds: No     Urinary catheter: voiding  Is there a velez order: No     LDAs:      Venous Access Device L chest port (Active)     Peripheral IV 07/14/22 Posterior;Right Hand (Active)         Opportunity for questions and clarification was provided.     Elissa Garcia RN

## 2022-07-15 NOTE — PROGRESS NOTES
Intubation Note    Called to bedside secondary to  respiratory failure. Patient pre-oxygenated with 100% oxygen. Smooth RSI with Succinylcholine 100 mg IV. DVL x 1 Large amount of gastric contents noted in airway  7.5 ETT taped and secured at 23 cm at the teeth.    + Bilateral BS, + Chest rise, + ETCO2    CXR pending.     Care turned over to covering Attending MD.

## 2022-07-15 NOTE — PROGRESS NOTES
Responded to rapid response. Ms. Aide Brady appears very uncomfortable with a HR in the 140s, BP in 90s/60s and RR in the 40s. She states that she is not in pain, says she feel short of breath and like she needs to sit up.   -EKG  -STAT ABG  -250cc fluid bolus  -50g IV albumin  -1mg PO ativan  -IV compazine for nausea    EKG shows sinus tachycardia. Reviewed morning labs, worsening of REBECCA. ABG shows worsening of metabolic acidosis. Concern of anxiety being driven by respiratory compensation for metabolic acidosis. Patient is oxygenating OK. PE considered, INR 2.1 and with Cr 2.7, no ability to perform PECT.   Of note, she is receiving palliative chemotherapy, unsure of prognosis, oncology consultation in the am.  -2 amp sodium bicarb  -increase bicarb drip to 75cc/hr

## 2022-07-15 NOTE — PROGRESS NOTES
Patient seen after Rapid Response Alert called. Patient complains of some shortness of breath. Oxygen added at 2L. ABG drawn showing low HCO3. Results given to physician. Will continue to monitor respiratory needs of patient. Current SPO2 97% .

## 2022-07-15 NOTE — ASSESSMENT & PLAN NOTE
Bilirubin of 11. Patient with known liver metastases.   CT abdomen and pelvis shows no acute findings  -Consult gastroenterology

## 2022-07-15 NOTE — PROGRESS NOTES
Patient looks less agitated and more calm after ativan and compazine. Discussed briefly with intensivist, and we both agree on no benefits of ICU transfer at this time.

## 2022-07-15 NOTE — ROUTINE PROCESS
0700- Report received from EDEN Luna. The patient is s/p cardiac arrest for the third time. Per Dr. Lamar Wilson and Dr. Jeniffer Foote, the patient is a DNR, and there will be no further escalation of care. The patient is hypotensive and agonally breathing. She is on multiple pressors and maximum vent support. 3098- Patient pronounced by Dr. Lamar Wilson. 18- Voice message left with the patient's sister to call the hospital as soon as possible. [address] notified of the patient's demise. 0- Patient's family arrived to the unit. Family placed in the conference room with nurse, pily, and MD.    1100- Family into see patient. 1330- Postmortem care provided. 1419- [place] [facility] [place]

## 2022-07-15 NOTE — PROGRESS NOTES
was paged to meet with the family.  coordinated services with BODØ (nurse) and the doctor.  met the patient's family member in the family room.  provided a presence, music, held the family's hand and gave the family tissues. Family requested a moment to together and the  provided them with privacy.  talked with the nurse and coordinated services again. BODØ RN stated she needed a moment with the family.  advised the nurse page if pastoral care services are needed.  Rev. LUANA Foster Mt   Paging Service [phone])

## 2022-07-15 NOTE — PROGRESS NOTES
BODØ RN inquired in reference to the  that was present for the code/death of Ms. Finch in the early morning hours.  on the morning shift coordinated services with the nurse and advised her  of the name of the previous  for the nurse's paperwork. .  advised the nurse to please page when the family arrives.  Rev. LUANA Gonzales   Paging Service [phone])

## 2022-07-19 LAB
BACTERIA SPEC CULT: ABNORMAL
SERVICE CMNT-IMP: ABNORMAL

## 2022-07-19 NOTE — PROGRESS NOTES
Quality: Chart reviewed for death and restraints. Bilateral soft wrist restraints noted during hospitalization. Restraints not a contributing factor in patient death. Documented for tracking according to CMS guidelines at 1212 pm on 7/19/22.

## 2022-07-21 ENCOUNTER — TELEPHONE (OUTPATIENT)
Dept: ONCOLOGY | Age: 42
End: 2022-07-21

## 2022-07-21 NOTE — TELEPHONE ENCOUNTER
Called the patient's sister and left a voicemail. Expressed condolences and informed that I may try later when I have a minute.

## 2022-07-28 ENCOUNTER — APPOINTMENT (OUTPATIENT)
Dept: INFUSION THERAPY | Age: 42
End: 2022-07-28

## 2022-07-28 NOTE — DISCHARGE SUMMARY
[facility]  DEATH SUMMARY    Name: Alfredo Jacobo   : 1980   MRN: [id number]   Date: 2022      DATE OF ADMISSION: 2022  DATE OF DISCHARGE/DEATH:7/15/2022        BRIEF SUMMARY OF ADMISSION and HOSPITAL COURSE  Pt was admitted with the following HPI and assessment and plan:  70-year-old female with a past medical history significant for metastatic breast cancer to bones and liver who presents to the emergency department complaining of weakness, jaundice and diarrhea. Of note, she was recently diagnosed with right-sided breast cancer that was found to have metastasized to her liver and bones. She is followed-up by Dr. Ashlyn Herbert who started her on palliative chemotherapy with Taxotere, Transtuzumab and Petuzumab on 2022. She reports that over the last several days, she has been having abdominal distention, jaundice, diarrhea as well as weakness. She states that she generally feels unwell and that she feels very tired. She states that she has not been sleeping well, but she believes that her tiredness is related to more than that. Otherwise, she denies any fever/chills, headache, dizziness, chest pain, palpitations, cough, shortness of breath, abdominal pain, nausea vomiting, lower extremity swelling, dysuria or hematuria. Her family reports that this yellowing of her skin has been going on for the last week or so. In ED, labs notable for WBC 0.4, Hgb 11.6 and . INR 2.1. Na 130, CO2  16, BUN 39, creatinine 2.2, bilirubin 11, albumin 2.0, ALT 68,  and alk phos 584. Lactic acid 4.9. CT scan chest abdomen and pelvis showed no acute abnormality. On floor, tachycardic and tachypnic, agitated. Tx with ativan and haldol. Progressed to RRT and ultimately resp arrest into PEA, ACLS x 2 rounds with epi and NaHCO3 and intubation with ROSC. Notable aspiration on intubation.      In ICU, bradycardic to PEA code ACLS with mult rounds of epi, NaHCO3 with shockable rhythm, DCCV with ROSC. Then bradycardic to PEA codes x2 with multiple rounds of ACLS, epi, NaHCO3 and ROSC. Started NE and Epi gtts. Attempted to call family at numbers in chart and left voicemail's where able. She suffered cardiac arrest again and . Family was notified.     CAUSE OF DEATH:  Severe sepsis, complications of metastatic breast cancer    OTHER DIAGNOSES/CONDITIONS AT TIME OF DEATH:  Sepsis  Respiratory Failure  Acute Kidney Injury  Severe Sepsis (Sepsis with 1 or more end-organ failures)  Lactic Acidosis  Respiratory Arrest  Metastatic breast cancer           Willa Zazueta MD [address] [place] [phone]  [phone]    2022 1:40 PM

## 2022-07-29 ENCOUNTER — APPOINTMENT (OUTPATIENT)
Dept: INFUSION THERAPY | Age: 42
End: 2022-07-29

## 2022-08-04 ENCOUNTER — APPOINTMENT (OUTPATIENT)
Dept: PHYSICAL THERAPY | Age: 42
End: 2022-08-04

## 2022-08-19 ENCOUNTER — APPOINTMENT (OUTPATIENT)
Dept: INFUSION THERAPY | Age: 42
End: 2022-08-19

## 2022-09-09 ENCOUNTER — APPOINTMENT (OUTPATIENT)
Dept: INFUSION THERAPY | Age: 42
End: 2022-09-09

## 2022-09-30 ENCOUNTER — APPOINTMENT (OUTPATIENT)
Dept: INFUSION THERAPY | Age: 42
End: 2022-09-30

## (undated) DEVICE — INSULATED BLADE ELECTRODE: Brand: EDGE

## (undated) DEVICE — CHEST/BREAST-MRMCASU: Brand: MEDLINE INDUSTRIES, INC.

## (undated) DEVICE — ADHESIVE SKIN CLSR 0.7ML TOP DERMBND ADV

## (undated) DEVICE — PAD,NON-ADHERENT,2X3,STERILE,LF,1/PK: Brand: MEDLINE

## (undated) DEVICE — Z INACTIVE USE 2854097 SPONGE GZ W4XL4IN COT 12 PLY TYP VII WVN C FLD DSGN

## (undated) DEVICE — SUTURE VCRL SZ 3-0 L27IN ABSRB UD L26MM SH 1/2 CIR J416H

## (undated) DEVICE — 1010 S-DRAPE TOWEL DRAPE 10/BX: Brand: STERI-DRAPE™

## (undated) DEVICE — SOL IRRIGATION INJ NACL 0.9% 500ML BTL

## (undated) DEVICE — HYPODERMIC SAFETY NEEDLE: Brand: MONOJECT

## (undated) DEVICE — SYRINGE MED 10ML LUERLOCK TIP W/O SFTY DISP

## (undated) DEVICE — DECANTER BAG 9": Brand: MEDLINE INDUSTRIES, INC.

## (undated) DEVICE — SUTURE PROL SZ 2-0 L36IN NONABSORBABLE BLU SH L26MM 1/2 CIR 8523H

## (undated) DEVICE — PROVE COVER: Brand: UNBRANDED

## (undated) DEVICE — GOWN,SIRUS,FABRNF,XL,20/CS: Brand: MEDLINE

## (undated) DEVICE — SUTURE PERMA-HAND SZ 2-0 L30IN NONABSORBABLE BLK L26MM SH K833H

## (undated) DEVICE — ROCKER SWITCH PENCIL BLADE ELECTRODE, HOLSTER: Brand: EDGE

## (undated) DEVICE — INTENDED FOR TISSUE SEPARATION, AND OTHER PROCEDURES THAT REQUIRE A SHARP SURGICAL BLADE TO PUNCTURE OR CUT.: Brand: BARD-PARKER ® CARBON RIB-BACK BLADES

## (undated) DEVICE — SOLUTION IV 500ML 0.9% SOD CHL PH 5 INJ USP VIAFLX PLAS

## (undated) DEVICE — SUTURE MCRYL SZ 4-0 L27IN ABSRB UD L19MM PS-2 1/2 CIR PRIM Y426H

## (undated) DEVICE — GLOVE SURG SZ 6 L12IN FNGR THK79MIL GRN LTX FREE

## (undated) DEVICE — HYPODERMIC SAFETY NEEDLE: Brand: MAGELLAN

## (undated) DEVICE — C-ARM: Brand: UNBRANDED

## (undated) DEVICE — GLOVE SURG SZ 65 L12IN FNGR THK79MIL GRN LTX FREE